# Patient Record
Sex: FEMALE | Race: WHITE | NOT HISPANIC OR LATINO | Employment: FULL TIME | URBAN - METROPOLITAN AREA
[De-identification: names, ages, dates, MRNs, and addresses within clinical notes are randomized per-mention and may not be internally consistent; named-entity substitution may affect disease eponyms.]

---

## 2017-04-25 ENCOUNTER — OFFICE VISIT - HEALTHEAST (OUTPATIENT)
Dept: FAMILY MEDICINE | Facility: CLINIC | Age: 42
End: 2017-04-25

## 2017-04-25 ENCOUNTER — COMMUNICATION - HEALTHEAST (OUTPATIENT)
Dept: FAMILY MEDICINE | Facility: CLINIC | Age: 42
End: 2017-04-25

## 2017-04-25 DIAGNOSIS — F41.9 ANXIETY: ICD-10-CM

## 2017-04-25 DIAGNOSIS — K14.3 COATED TONGUE: ICD-10-CM

## 2017-04-25 DIAGNOSIS — E55.9 VITAMIN D DEFICIENCY: ICD-10-CM

## 2017-04-25 DIAGNOSIS — K58.9 IRRITABLE BOWEL SYNDROME: ICD-10-CM

## 2017-04-25 DIAGNOSIS — Z13.0 SCREENING FOR DEFICIENCY ANEMIA: ICD-10-CM

## 2017-04-25 DIAGNOSIS — M25.50 JOINT ACHE: ICD-10-CM

## 2017-04-28 ENCOUNTER — COMMUNICATION - HEALTHEAST (OUTPATIENT)
Dept: FAMILY MEDICINE | Facility: CLINIC | Age: 42
End: 2017-04-28

## 2017-05-01 ENCOUNTER — COMMUNICATION - HEALTHEAST (OUTPATIENT)
Dept: FAMILY MEDICINE | Facility: CLINIC | Age: 42
End: 2017-05-01

## 2017-05-02 ENCOUNTER — OFFICE VISIT - HEALTHEAST (OUTPATIENT)
Dept: FAMILY MEDICINE | Facility: CLINIC | Age: 42
End: 2017-05-02

## 2017-05-02 ENCOUNTER — COMMUNICATION - HEALTHEAST (OUTPATIENT)
Dept: FAMILY MEDICINE | Facility: CLINIC | Age: 42
End: 2017-05-02

## 2017-05-02 DIAGNOSIS — R76.8 ELEVATED SACCHAROMYCES CEREVISIAE ANTIBODY LEVEL: ICD-10-CM

## 2017-05-02 DIAGNOSIS — E55.9 VITAMIN D DEFICIENCY: ICD-10-CM

## 2017-05-02 DIAGNOSIS — E06.3 HYPOTHYROIDISM DUE TO HASHIMOTO'S THYROIDITIS: ICD-10-CM

## 2017-05-09 ENCOUNTER — COMMUNICATION - HEALTHEAST (OUTPATIENT)
Dept: FAMILY MEDICINE | Facility: CLINIC | Age: 42
End: 2017-05-09

## 2017-05-15 ENCOUNTER — AMBULATORY - HEALTHEAST (OUTPATIENT)
Dept: FAMILY MEDICINE | Facility: CLINIC | Age: 42
End: 2017-05-15

## 2017-05-15 ENCOUNTER — COMMUNICATION - HEALTHEAST (OUTPATIENT)
Dept: FAMILY MEDICINE | Facility: CLINIC | Age: 42
End: 2017-05-15

## 2017-06-06 ENCOUNTER — OFFICE VISIT - HEALTHEAST (OUTPATIENT)
Dept: FAMILY MEDICINE | Facility: CLINIC | Age: 42
End: 2017-06-06

## 2017-06-06 ENCOUNTER — COMMUNICATION - HEALTHEAST (OUTPATIENT)
Dept: FAMILY MEDICINE | Facility: CLINIC | Age: 42
End: 2017-06-06

## 2017-06-06 DIAGNOSIS — E03.9 HYPOTHYROID: ICD-10-CM

## 2017-06-06 DIAGNOSIS — F43.10 PTSD (POST-TRAUMATIC STRESS DISORDER): ICD-10-CM

## 2017-06-06 DIAGNOSIS — F41.9 ANXIETY: ICD-10-CM

## 2017-06-06 ASSESSMENT — MIFFLIN-ST. JEOR: SCORE: 1206.53

## 2017-06-12 ENCOUNTER — COMMUNICATION - HEALTHEAST (OUTPATIENT)
Dept: FAMILY MEDICINE | Facility: CLINIC | Age: 42
End: 2017-06-12

## 2017-06-16 ENCOUNTER — RECORDS - HEALTHEAST (OUTPATIENT)
Dept: ADMINISTRATIVE | Facility: OTHER | Age: 42
End: 2017-06-16

## 2017-06-20 ENCOUNTER — COMMUNICATION - HEALTHEAST (OUTPATIENT)
Dept: FAMILY MEDICINE | Facility: CLINIC | Age: 42
End: 2017-06-20

## 2017-06-26 ENCOUNTER — COMMUNICATION - HEALTHEAST (OUTPATIENT)
Dept: FAMILY MEDICINE | Facility: CLINIC | Age: 42
End: 2017-06-26

## 2018-03-12 ENCOUNTER — OFFICE VISIT - HEALTHEAST (OUTPATIENT)
Dept: FAMILY MEDICINE | Facility: CLINIC | Age: 43
End: 2018-03-12

## 2018-03-12 DIAGNOSIS — K58.9 IRRITABLE BOWEL SYNDROME: ICD-10-CM

## 2018-03-12 DIAGNOSIS — E06.3 HYPOTHYROIDISM DUE TO HASHIMOTO'S THYROIDITIS: ICD-10-CM

## 2018-03-12 LAB
T3FREE SERPL-MCNC: 2.8 PG/ML (ref 1.9–3.9)
T4 TOTAL - HISTORICAL: 7.8 UG/DL (ref 4.5–13)
TSH SERPL DL<=0.005 MIU/L-ACNC: 3.51 UIU/ML (ref 0.3–5)

## 2018-03-12 ASSESSMENT — MIFFLIN-ST. JEOR: SCORE: 1181.59

## 2018-03-13 ENCOUNTER — COMMUNICATION - HEALTHEAST (OUTPATIENT)
Dept: FAMILY MEDICINE | Facility: CLINIC | Age: 43
End: 2018-03-13

## 2018-03-13 LAB — THYROID PEROXIDASE ANTIBODIES - HISTORICAL: 599.3 IU/ML (ref 0–5.6)

## 2018-03-15 LAB — T3REVERSE SERPL-MCNC: 13.9 NG/DL (ref 9–27)

## 2018-03-16 LAB — TSI SER-ACNC: <1 TSI INDEX

## 2018-03-20 ENCOUNTER — COMMUNICATION - HEALTHEAST (OUTPATIENT)
Dept: FAMILY MEDICINE | Facility: CLINIC | Age: 43
End: 2018-03-20

## 2018-06-19 ENCOUNTER — OFFICE VISIT - HEALTHEAST (OUTPATIENT)
Dept: FAMILY MEDICINE | Facility: CLINIC | Age: 43
End: 2018-06-19

## 2018-06-19 DIAGNOSIS — R10.10 UPPER ABDOMINAL PAIN: ICD-10-CM

## 2018-06-19 DIAGNOSIS — F41.9 ANXIETY: ICD-10-CM

## 2018-06-19 DIAGNOSIS — Z00.00 ANNUAL PHYSICAL EXAM: ICD-10-CM

## 2018-06-19 DIAGNOSIS — E06.3 HYPOTHYROIDISM DUE TO HASHIMOTO'S THYROIDITIS: ICD-10-CM

## 2018-06-19 LAB
ALBUMIN SERPL-MCNC: 4.6 G/DL (ref 3.5–5)
ALBUMIN UR-MCNC: NEGATIVE MG/DL
ALP SERPL-CCNC: 63 U/L (ref 45–120)
ALT SERPL W P-5'-P-CCNC: <9 U/L (ref 0–45)
AMYLASE SERPL-CCNC: 57 U/L (ref 5–120)
ANION GAP SERPL CALCULATED.3IONS-SCNC: 14 MMOL/L (ref 5–18)
APPEARANCE UR: CLEAR
AST SERPL W P-5'-P-CCNC: 12 U/L (ref 0–40)
BACTERIA #/AREA URNS HPF: ABNORMAL HPF
BASOPHILS # BLD AUTO: 0.1 THOU/UL (ref 0–0.2)
BASOPHILS NFR BLD AUTO: 1 % (ref 0–2)
BILIRUB SERPL-MCNC: 0.4 MG/DL (ref 0–1)
BILIRUB UR QL STRIP: NEGATIVE
BUN SERPL-MCNC: 7 MG/DL (ref 8–22)
CALCIUM SERPL-MCNC: 9.9 MG/DL (ref 8.5–10.5)
CHLORIDE BLD-SCNC: 104 MMOL/L (ref 98–107)
CO2 SERPL-SCNC: 24 MMOL/L (ref 22–31)
COLOR UR AUTO: YELLOW
CREAT SERPL-MCNC: 0.69 MG/DL (ref 0.6–1.1)
EOSINOPHIL # BLD AUTO: 0.1 THOU/UL (ref 0–0.4)
EOSINOPHIL NFR BLD AUTO: 1 % (ref 0–6)
ERYTHROCYTE [DISTWIDTH] IN BLOOD BY AUTOMATED COUNT: 11.5 % (ref 11–14.5)
GFR SERPL CREATININE-BSD FRML MDRD: >60 ML/MIN/1.73M2
GLUCOSE BLD-MCNC: 92 MG/DL (ref 70–125)
GLUCOSE UR STRIP-MCNC: NEGATIVE MG/DL
HCT VFR BLD AUTO: 40.2 % (ref 35–47)
HGB BLD-MCNC: 13.7 G/DL (ref 12–16)
HGB UR QL STRIP: ABNORMAL
KETONES UR STRIP-MCNC: NEGATIVE MG/DL
LEUKOCYTE ESTERASE UR QL STRIP: NEGATIVE
LIPASE SERPL-CCNC: 15 U/L (ref 0–52)
LYMPHOCYTES # BLD AUTO: 1.7 THOU/UL (ref 0.8–4.4)
LYMPHOCYTES NFR BLD AUTO: 16 % (ref 20–40)
MCH RBC QN AUTO: 32.5 PG (ref 27–34)
MCHC RBC AUTO-ENTMCNC: 34 G/DL (ref 32–36)
MCV RBC AUTO: 96 FL (ref 80–100)
MONOCYTES # BLD AUTO: 0.7 THOU/UL (ref 0–0.9)
MONOCYTES NFR BLD AUTO: 6 % (ref 2–10)
NEUTROPHILS # BLD AUTO: 7.9 THOU/UL (ref 2–7.7)
NEUTROPHILS NFR BLD AUTO: 76 % (ref 50–70)
NITRATE UR QL: NEGATIVE
PH UR STRIP: 5.5 [PH] (ref 5–8)
PLATELET # BLD AUTO: 275 THOU/UL (ref 140–440)
PMV BLD AUTO: 8.1 FL (ref 7–10)
POTASSIUM BLD-SCNC: 4 MMOL/L (ref 3.5–5)
PROT SERPL-MCNC: 7.6 G/DL (ref 6–8)
RBC # BLD AUTO: 4.21 MILL/UL (ref 3.8–5.4)
RBC #/AREA URNS AUTO: ABNORMAL HPF
SODIUM SERPL-SCNC: 142 MMOL/L (ref 136–145)
SP GR UR STRIP: <=1.005 (ref 1–1.03)
SQUAMOUS #/AREA URNS AUTO: ABNORMAL LPF
UROBILINOGEN UR STRIP-ACNC: ABNORMAL
WBC #/AREA URNS AUTO: ABNORMAL HPF
WBC: 10.4 THOU/UL (ref 4–11)

## 2018-06-19 ASSESSMENT — MIFFLIN-ST. JEOR: SCORE: 1161.17

## 2018-06-20 ENCOUNTER — COMMUNICATION - HEALTHEAST (OUTPATIENT)
Dept: FAMILY MEDICINE | Facility: CLINIC | Age: 43
End: 2018-06-20

## 2018-06-20 ENCOUNTER — AMBULATORY - HEALTHEAST (OUTPATIENT)
Dept: FAMILY MEDICINE | Facility: CLINIC | Age: 43
End: 2018-06-20

## 2018-06-20 DIAGNOSIS — R31.29 MICROSCOPIC HEMATURIA: ICD-10-CM

## 2018-09-05 ENCOUNTER — OFFICE VISIT - HEALTHEAST (OUTPATIENT)
Dept: FAMILY MEDICINE | Facility: CLINIC | Age: 43
End: 2018-09-05

## 2018-09-05 DIAGNOSIS — E06.3 HASHIMOTO'S THYROIDITIS: ICD-10-CM

## 2018-09-05 DIAGNOSIS — F32.A DEPRESSION: ICD-10-CM

## 2018-09-05 DIAGNOSIS — F41.9 ANXIETY: ICD-10-CM

## 2018-09-05 LAB
T3FREE SERPL-MCNC: 3 PG/ML (ref 1.9–3.9)
T4 FREE SERPL-MCNC: 0.9 NG/DL (ref 0.7–1.8)
TSH SERPL DL<=0.005 MIU/L-ACNC: 2.66 UIU/ML (ref 0.3–5)

## 2018-09-05 ASSESSMENT — MIFFLIN-ST. JEOR: SCORE: 1156.64

## 2018-09-06 LAB — THYROID PEROXIDASE ANTIBODIES - HISTORICAL: 746.3 IU/ML (ref 0–5.6)

## 2018-11-26 ENCOUNTER — OFFICE VISIT - HEALTHEAST (OUTPATIENT)
Dept: FAMILY MEDICINE | Facility: CLINIC | Age: 43
End: 2018-11-26

## 2018-11-26 ENCOUNTER — COMMUNICATION - HEALTHEAST (OUTPATIENT)
Dept: FAMILY MEDICINE | Facility: CLINIC | Age: 43
End: 2018-11-26

## 2018-11-26 DIAGNOSIS — E06.3 HYPOTHYROIDISM DUE TO HASHIMOTO'S THYROIDITIS: ICD-10-CM

## 2018-11-26 DIAGNOSIS — F41.1 ANXIETY STATE: ICD-10-CM

## 2018-11-26 ASSESSMENT — MIFFLIN-ST. JEOR: SCORE: 1147.57

## 2019-01-04 ENCOUNTER — COMMUNICATION - HEALTHEAST (OUTPATIENT)
Dept: FAMILY MEDICINE | Facility: CLINIC | Age: 44
End: 2019-01-04

## 2019-02-11 ENCOUNTER — COMMUNICATION - HEALTHEAST (OUTPATIENT)
Dept: FAMILY MEDICINE | Facility: CLINIC | Age: 44
End: 2019-02-11

## 2019-02-11 DIAGNOSIS — F41.1 ANXIETY STATE: ICD-10-CM

## 2019-02-25 ENCOUNTER — COMMUNICATION - HEALTHEAST (OUTPATIENT)
Dept: FAMILY MEDICINE | Facility: CLINIC | Age: 44
End: 2019-02-25

## 2019-02-25 ENCOUNTER — OFFICE VISIT - HEALTHEAST (OUTPATIENT)
Dept: FAMILY MEDICINE | Facility: CLINIC | Age: 44
End: 2019-02-25

## 2019-02-25 DIAGNOSIS — F41.1 ANXIETY STATE: ICD-10-CM

## 2019-02-25 DIAGNOSIS — E06.3 HYPOTHYROIDISM DUE TO HASHIMOTO'S THYROIDITIS: ICD-10-CM

## 2019-02-25 DIAGNOSIS — Z12.39 ENCOUNTER FOR OTHER SCREENING FOR MALIGNANT NEOPLASM OF BREAST: ICD-10-CM

## 2019-02-25 LAB
T3 SERPL-MCNC: 89 NG/DL (ref 45–175)
TSH SERPL DL<=0.005 MIU/L-ACNC: 4.42 UIU/ML (ref 0.3–5)

## 2019-02-26 LAB — THYROID PEROXIDASE ANTIBODIES - HISTORICAL: 846.8 IU/ML (ref 0–5.6)

## 2019-03-03 ENCOUNTER — COMMUNICATION - HEALTHEAST (OUTPATIENT)
Dept: FAMILY MEDICINE | Facility: CLINIC | Age: 44
End: 2019-03-03

## 2019-04-16 ENCOUNTER — COMMUNICATION - HEALTHEAST (OUTPATIENT)
Dept: FAMILY MEDICINE | Facility: CLINIC | Age: 44
End: 2019-04-16

## 2019-04-16 DIAGNOSIS — F41.1 ANXIETY STATE: ICD-10-CM

## 2019-04-29 ENCOUNTER — HOSPITAL ENCOUNTER (OUTPATIENT)
Dept: ULTRASOUND IMAGING | Facility: HOSPITAL | Age: 44
Discharge: HOME | End: 2019-04-29
Attending: FAMILY MEDICINE

## 2019-04-29 ENCOUNTER — RECORDS - HEALTHEAST (OUTPATIENT)
Dept: RADIOLOGY | Facility: CLINIC | Age: 44
End: 2019-04-29

## 2019-04-29 ENCOUNTER — COMMUNICATION - HEALTHEAST (OUTPATIENT)
Dept: FAMILY MEDICINE | Facility: CLINIC | Age: 44
End: 2019-04-29

## 2019-04-29 ENCOUNTER — HOSPITAL ENCOUNTER (OUTPATIENT)
Dept: MAMMOGRAPHY | Facility: CLINIC | Age: 44
Discharge: HOME | End: 2019-04-29
Attending: FAMILY MEDICINE

## 2019-04-29 ENCOUNTER — RECORDS - HEALTHEAST (OUTPATIENT)
Dept: ADMINISTRATIVE | Facility: OTHER | Age: 44
End: 2019-04-29

## 2019-04-29 DIAGNOSIS — E06.3 HYPOTHYROIDISM DUE TO HASHIMOTO'S THYROIDITIS: ICD-10-CM

## 2019-04-29 DIAGNOSIS — Z12.39 ENCOUNTER FOR OTHER SCREENING FOR MALIGNANT NEOPLASM OF BREAST: ICD-10-CM

## 2019-06-07 ENCOUNTER — AMBULATORY - HEALTHEAST (OUTPATIENT)
Dept: FAMILY MEDICINE | Facility: CLINIC | Age: 44
End: 2019-06-07

## 2019-06-07 ENCOUNTER — COMMUNICATION - HEALTHEAST (OUTPATIENT)
Dept: FAMILY MEDICINE | Facility: CLINIC | Age: 44
End: 2019-06-07

## 2019-06-07 DIAGNOSIS — F41.1 ANXIETY STATE: ICD-10-CM

## 2019-08-08 ENCOUNTER — COMMUNICATION - HEALTHEAST (OUTPATIENT)
Dept: FAMILY MEDICINE | Facility: CLINIC | Age: 44
End: 2019-08-08

## 2019-08-08 ENCOUNTER — OFFICE VISIT - HEALTHEAST (OUTPATIENT)
Dept: FAMILY MEDICINE | Facility: CLINIC | Age: 44
End: 2019-08-08

## 2019-08-08 DIAGNOSIS — E55.9 VITAMIN D DEFICIENCY: ICD-10-CM

## 2019-08-08 DIAGNOSIS — F41.1 ANXIETY STATE: ICD-10-CM

## 2019-08-08 DIAGNOSIS — E06.3 HYPOTHYROIDISM DUE TO HASHIMOTO'S THYROIDITIS: ICD-10-CM

## 2019-08-08 LAB
T3 SERPL-MCNC: 100 NG/DL (ref 45–175)
T4 FREE SERPL-MCNC: 0.8 NG/DL (ref 0.7–1.8)
THYROID PEROXIDASE ANTIBODIES - HISTORICAL: 849.3 IU/ML (ref 0–5.6)
TSH SERPL DL<=0.005 MIU/L-ACNC: 6.67 UIU/ML (ref 0.3–5)

## 2019-08-08 ASSESSMENT — MIFFLIN-ST. JEOR: SCORE: 1236.02

## 2019-08-09 LAB — 25(OH)D3 SERPL-MCNC: 30.4 NG/ML (ref 30–80)

## 2019-08-20 ENCOUNTER — COMMUNICATION - HEALTHEAST (OUTPATIENT)
Dept: FAMILY MEDICINE | Facility: CLINIC | Age: 44
End: 2019-08-20

## 2019-09-15 ENCOUNTER — COMMUNICATION - HEALTHEAST (OUTPATIENT)
Dept: FAMILY MEDICINE | Facility: CLINIC | Age: 44
End: 2019-09-15

## 2019-09-15 DIAGNOSIS — F41.1 ANXIETY STATE: ICD-10-CM

## 2019-10-08 ENCOUNTER — OFFICE VISIT - HEALTHEAST (OUTPATIENT)
Dept: FAMILY MEDICINE | Facility: CLINIC | Age: 44
End: 2019-10-08

## 2019-10-08 DIAGNOSIS — E06.3 HYPOTHYROIDISM DUE TO HASHIMOTO'S THYROIDITIS: ICD-10-CM

## 2019-10-08 DIAGNOSIS — R35.0 URINARY FREQUENCY: ICD-10-CM

## 2019-10-08 LAB
ALBUMIN UR-MCNC: NEGATIVE MG/DL
APPEARANCE UR: CLEAR
BACTERIA #/AREA URNS HPF: ABNORMAL HPF
BILIRUB UR QL STRIP: NEGATIVE
COLOR UR AUTO: YELLOW
GLUCOSE UR STRIP-MCNC: NEGATIVE MG/DL
HGB UR QL STRIP: ABNORMAL
KETONES UR STRIP-MCNC: NEGATIVE MG/DL
LEUKOCYTE ESTERASE UR QL STRIP: NEGATIVE
NITRATE UR QL: NEGATIVE
PH UR STRIP: 7 [PH] (ref 5–8)
RBC #/AREA URNS AUTO: ABNORMAL HPF
SP GR UR STRIP: 1.01 (ref 1–1.03)
SQUAMOUS #/AREA URNS AUTO: ABNORMAL LPF
TSH SERPL DL<=0.005 MIU/L-ACNC: 2.09 UIU/ML (ref 0.3–5)
UROBILINOGEN UR STRIP-ACNC: ABNORMAL
WBC #/AREA URNS AUTO: ABNORMAL HPF

## 2019-10-08 ASSESSMENT — MIFFLIN-ST. JEOR: SCORE: 1217.88

## 2019-10-21 ENCOUNTER — COMMUNICATION - HEALTHEAST (OUTPATIENT)
Dept: FAMILY MEDICINE | Facility: CLINIC | Age: 44
End: 2019-10-21

## 2019-11-19 ENCOUNTER — COMMUNICATION - HEALTHEAST (OUTPATIENT)
Dept: FAMILY MEDICINE | Facility: CLINIC | Age: 44
End: 2019-11-19

## 2019-11-19 DIAGNOSIS — E06.3 HYPOTHYROIDISM DUE TO HASHIMOTO'S THYROIDITIS: ICD-10-CM

## 2020-01-03 ENCOUNTER — OFFICE VISIT - HEALTHEAST (OUTPATIENT)
Dept: FAMILY MEDICINE | Facility: CLINIC | Age: 45
End: 2020-01-03

## 2020-01-03 DIAGNOSIS — R32 URINARY INCONTINENCE, UNSPECIFIED TYPE: ICD-10-CM

## 2020-01-03 DIAGNOSIS — R35.0 FREQUENCY OF URINATION: ICD-10-CM

## 2020-01-03 DIAGNOSIS — E06.3 HYPOTHYROIDISM DUE TO HASHIMOTO'S THYROIDITIS: ICD-10-CM

## 2020-01-03 LAB
T3 SERPL-MCNC: 106 NG/DL (ref 45–175)
T4 FREE SERPL-MCNC: 0.7 NG/DL (ref 0.7–1.8)
TSH SERPL DL<=0.005 MIU/L-ACNC: 2.66 UIU/ML (ref 0.3–5)

## 2020-01-03 ASSESSMENT — MIFFLIN-ST. JEOR: SCORE: 1225.82

## 2020-01-06 ENCOUNTER — COMMUNICATION - HEALTHEAST (OUTPATIENT)
Dept: FAMILY MEDICINE | Facility: CLINIC | Age: 45
End: 2020-01-06

## 2020-01-20 ENCOUNTER — RECORDS - HEALTHEAST (OUTPATIENT)
Dept: ADMINISTRATIVE | Facility: OTHER | Age: 45
End: 2020-01-20

## 2020-03-17 ENCOUNTER — COMMUNICATION - HEALTHEAST (OUTPATIENT)
Dept: FAMILY MEDICINE | Facility: CLINIC | Age: 45
End: 2020-03-17

## 2020-03-17 DIAGNOSIS — F41.1 ANXIETY STATE: ICD-10-CM

## 2020-03-19 RX ORDER — ALPRAZOLAM 0.5 MG
TABLET ORAL
Qty: 30 TABLET | Refills: 0 | Status: SHIPPED | OUTPATIENT
Start: 2020-03-19

## 2020-09-06 ENCOUNTER — COMMUNICATION - HEALTHEAST (OUTPATIENT)
Dept: FAMILY MEDICINE | Facility: CLINIC | Age: 45
End: 2020-09-06

## 2020-09-06 DIAGNOSIS — F41.1 ANXIETY STATE: ICD-10-CM

## 2020-11-04 ENCOUNTER — VIRTUAL VISIT (OUTPATIENT)
Dept: URGENT CARE | Facility: CLINIC | Age: 45
End: 2020-11-04

## 2020-11-04 DIAGNOSIS — R05.9 COUGH: Primary | ICD-10-CM

## 2020-11-05 NOTE — PROGRESS NOTES
Note: Attempted to reach patient multiple times starting at 6:30 PM of her appointment up through 9:00 PM.  I left multiple messages and told her to reengage with another appointment time if she needed our assistance.

## 2020-11-22 ENCOUNTER — COMMUNICATION - HEALTHEAST (OUTPATIENT)
Dept: FAMILY MEDICINE | Facility: CLINIC | Age: 45
End: 2020-11-22

## 2020-11-22 DIAGNOSIS — E06.3 HYPOTHYROIDISM DUE TO HASHIMOTO'S THYROIDITIS: ICD-10-CM

## 2020-12-18 ENCOUNTER — COMMUNICATION - HEALTHEAST (OUTPATIENT)
Dept: FAMILY MEDICINE | Facility: CLINIC | Age: 45
End: 2020-12-18

## 2020-12-18 DIAGNOSIS — F41.1 ANXIETY STATE: ICD-10-CM

## 2020-12-22 ENCOUNTER — OFFICE VISIT - HEALTHEAST (OUTPATIENT)
Dept: FAMILY MEDICINE | Facility: CLINIC | Age: 45
End: 2020-12-22

## 2020-12-22 DIAGNOSIS — E06.3 HYPOTHYROIDISM DUE TO HASHIMOTO'S THYROIDITIS: ICD-10-CM

## 2020-12-22 DIAGNOSIS — F43.10 PTSD (POST-TRAUMATIC STRESS DISORDER): ICD-10-CM

## 2020-12-22 DIAGNOSIS — E55.9 VITAMIN D DEFICIENCY: ICD-10-CM

## 2020-12-22 LAB
T3 SERPL-MCNC: 104 NG/DL (ref 45–175)
T4 FREE SERPL-MCNC: 0.7 NG/DL (ref 0.7–1.8)
TSH SERPL DL<=0.005 MIU/L-ACNC: 5.74 UIU/ML (ref 0.3–5)

## 2020-12-22 ASSESSMENT — PATIENT HEALTH QUESTIONNAIRE - PHQ9: SUM OF ALL RESPONSES TO PHQ QUESTIONS 1-9: 8

## 2020-12-22 NOTE — ASSESSMENT & PLAN NOTE
Low normal vitamin D  Encouraged to take a supplement 5000 IU daily     specially in the setting of COVID-19
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
## 2020-12-24 ENCOUNTER — COMMUNICATION - HEALTHEAST (OUTPATIENT)
Dept: FAMILY MEDICINE | Facility: CLINIC | Age: 45
End: 2020-12-24

## 2021-01-29 ENCOUNTER — COMMUNICATION - HEALTHEAST (OUTPATIENT)
Dept: FAMILY MEDICINE | Facility: CLINIC | Age: 46
End: 2021-01-29

## 2021-01-31 ENCOUNTER — AMBULATORY - HEALTHEAST (OUTPATIENT)
Dept: FAMILY MEDICINE | Facility: CLINIC | Age: 46
End: 2021-01-31

## 2021-03-22 ENCOUNTER — COMMUNICATION - HEALTHEAST (OUTPATIENT)
Dept: FAMILY MEDICINE | Facility: CLINIC | Age: 46
End: 2021-03-22

## 2021-03-22 DIAGNOSIS — E06.3 HYPOTHYROIDISM DUE TO HASHIMOTO'S THYROIDITIS: ICD-10-CM

## 2021-03-22 RX ORDER — THYROID 30 MG/1
30 TABLET ORAL DAILY
Qty: 90 TABLET | Refills: 1 | Status: SHIPPED | OUTPATIENT
Start: 2021-03-22 | End: 2021-09-15

## 2021-03-22 RX ORDER — THYROID,PORK 15 MG
1 TABLET ORAL DAILY
Qty: 90 TABLET | Refills: 1 | Status: SHIPPED | OUTPATIENT
Start: 2021-03-22 | End: 2021-09-15

## 2021-04-07 ENCOUNTER — COMMUNICATION - HEALTHEAST (OUTPATIENT)
Dept: FAMILY MEDICINE | Facility: CLINIC | Age: 46
End: 2021-04-07

## 2021-04-07 DIAGNOSIS — F41.1 ANXIETY STATE: ICD-10-CM

## 2021-04-08 RX ORDER — SERTRALINE HYDROCHLORIDE 100 MG/1
100 TABLET, FILM COATED ORAL DAILY
Qty: 90 TABLET | Refills: 1 | Status: SHIPPED | OUTPATIENT
Start: 2021-04-08 | End: 2021-10-12

## 2021-05-26 ASSESSMENT — PATIENT HEALTH QUESTIONNAIRE - PHQ9: SUM OF ALL RESPONSES TO PHQ QUESTIONS 1-9: 8

## 2021-05-30 VITALS — BODY MASS INDEX: 25.83 KG/M2 | WEIGHT: 141.25 LBS

## 2021-05-30 VITALS — BODY MASS INDEX: 25.97 KG/M2 | WEIGHT: 142 LBS

## 2021-05-31 VITALS — WEIGHT: 133 LBS | HEIGHT: 62 IN | BODY MASS INDEX: 24.48 KG/M2

## 2021-05-31 NOTE — PROGRESS NOTES
ASSESSMENT & PLAN    No problem-specific Assessment & Plan notes found for this encounter.      Tiffanie was seen today for follow-up.    Diagnoses and all orders for this visit:    Hypothyroidism due to Hashimoto's thyroiditis  -     thyroid, pork, (ARMOUR THYROID) 15 mg Tab; 1/2 TABLET  DAILY FOR 3 WEEKS THEN MAY INCREASE TO 1 DAILY  -     T3, Total  -     Thyroid Stimulating Hormone (TSH)  -     Thyroid Peroxidase Antibody  -     T4, Free    Anxiety Disorder NOS  -     ALPRAZolam (XANAX) 0.5 MG tablet; Take 1 tablet (0.5 mg total) by mouth 3 (three) times a day as needed for sleep or anxiety.  -     Ambulatory referral to Psychology    Vitamin D deficiency  -     Vitamin D, Total (25-Hydroxy)        Patient Instructions   Thank you for coming in today    We are going to start [place] Thyroid 15 mg 1/2 tablet daily equaling 7.5 mg total for the first 3 weeks  You may increase this to 1 tablet daily if this is effective but does not cause adverse symptoms    Our goal is for your TSH equal 1  Our goal is for your T3 level to be at high end of normal    For Hashimoto's there is some data that naltrexone low-dose may be helpful please read about this medication    Continue to support your clinical condition with restorative sleep, regular exercise, and high nutrient foods including continue to eliminate gluten and dairy    I have put in a referral for Shyanne Simons    Vitamins and nutrients to support your thyroid function are:    You may find these in numerous multivitamins:    Selenium 200 mcg daily  Zinc 30 mg Daily/ Copper 1 mg   Vitamin D3 / K2 2000- 5000 IU /  mcg Daily (Shoot for a level of 50-60 vitamin D--need to follow)  Iodine 150 mcg Daily  Vitamin A 5000 IU Daily  Iron 18 mg Daily      Consider  Nigella Sativa (Black Cumin) 1 gram Twice Daily      Nutrients to help T4àT3 Conversion    Iodine 300 -1000 mcg Daily  L-Tyrosine 300-1000 mg Daily  Selenium 100-600 mcg Daily  Vitamin A 5758-8282 IU  Daily  Vitamin E 200-400 IU Daily  N Acetyl Cysteine  500-2000 mg Daily  Zinc 30 mg Daily  Copper 1 mg Daily  Ashwagandha 500 -1500 mg Daily  Guggulipid extract  Turmeric (Curcumin) 500-1000 mg Daily      Think Autoimmune suppression IF High TSH and High TPO   This is you               No follow-ups on file.            CHIEF COMPLAINT: Tiffanie Ye had concerns including Follow-up (medication check, check thyroid).    False Pass: 1.............. had concerns including Follow-up (medication check, check thyroid).    1. Hypothyroidism due to Hashimoto's thyroiditis    2. Anxiety Disorder NOS    3. Vitamin D deficiency          CC:             Why are you here today?                              Follow up medications, thyroid    Continue to have symptoms of anxiety  Has identified that work is the most prominent stressor  Had been some DBT but little too intense  Currently on sertraline doing well 100mg is replacing vitamin D  Works from home feels better  Wants to reincorporate exercise as well as other things into her nutritional plan  Prominent trouble falling asleep staying asleep feeling bad about herself  Also more than half of today's moving slowly overeating feeling tired feeling down depressed little interest in doing things somewhat difficult she relates is her scale  PHQ 9 today is 17    Any other Problems in order of Priority:        SUBJECTIVE:  Tiffanie Ye is a 44 y.o. female    No past medical history on file.  Past Surgical History:   Procedure Laterality Date     HI D&C OF CERVIX STUMP      Description: Dilation And Curettage Of Cervical Stump;  Recorded: 09/08/2013;     Penicillins  Current Outpatient Medications   Medication Sig Dispense Refill     cholecalciferol, vitamin D3, (VITAMIN D3) 2,000 unit cap Take by mouth.       sertraline (ZOLOFT) 100 MG tablet Take 1 tablet (100 mg total) by mouth daily. 90 tablet 1     UNABLE TO FIND 2 capsules daily. Med Name: (CuraPro) Powerful Antioxidant         UNABLE TO FIND Med Name:mushrooms + herbs       ALPRAZolam (XANAX) 0.5 MG tablet Take 1 tablet (0.5 mg total) by mouth 3 (three) times a day as needed for sleep or anxiety. 30 tablet 0     thyroid, pork, (ARMOUR THYROID) 15 mg Tab 1/2 TABLET  DAILY FOR 3 WEEKS THEN MAY INCREASE TO 1 DAILY 30 tablet 2     No current facility-administered medications for this visit.      Family History   Problem Relation Age of Onset     Breast cancer Mother 67     Hypertension Father      Colon cancer Paternal Grandmother 80     Breast cancer Maternal Grandmother      Social History     Socioeconomic History     Marital status: Single     Spouse name: None     Number of children: None     Years of education: None     Highest education level: None   Occupational History     None   Social Needs     Financial resource strain: None     Food insecurity:     Worry: None     Inability: None     Transportation needs:     Medical: None     Non-medical: None   Tobacco Use     Smoking status: Never Smoker     Smokeless tobacco: Never Used   Substance and Sexual Activity     Alcohol use: Yes     Comment: once every 2 weeks     Drug use: No     Sexual activity: None   Lifestyle     Physical activity:     Days per week: None     Minutes per session: None     Stress: None   Relationships     Social connections:     Talks on phone: None     Gets together: None     Attends Mandaeism service: None     Active member of club or organization: None     Attends meetings of clubs or organizations: None     Relationship status: None     Intimate partner violence:     Fear of current or ex partner: None     Emotionally abused: None     Physically abused: None     Forced sexual activity: None   Other Topics Concern     None   Social History Narrative     None     Patient Active Problem List   Diagnosis     Herpes Simplex Type I     Genital Warts     Common Migraine (Without Aura)     Allergic Rhinitis     Anxiety Disorder NOS     Irritable Bowel Syndrome      "Hypothyroidism due to Hashimoto's thyroiditis     Vitamin D deficiency     Elevated Saccharomyces cerevisiae antibody level IgA  2017                                              SOCIAL: She  reports that she has never smoked. She has never used smokeless tobacco. She reports that she drinks alcohol. She reports that she does not use drugs.    REVIEW OF SYSTEMS:   Family history not pertinent to chief complaint or presenting problem    Review of Systems:      Nervous System:  No headache, paresthesia or dizziness or fainting                                  Ears: No hearing loss or ringing in the ears    Eyes: No blurring of vision, Double Vision            No redness, itching or dryness.    Nose: No nosebleed or loss of smell    Mouth: No mouth sores or  coated tongue    Throat: No hoarseness or difficulty swallowing    Neck: No enlarged thyroid or lymph nodes.    Heart: No chest pain, palpitation or irregular heartbeat.                  Lungs: No shortness of breath, wheezing or hemoptysis.    Gastrointestinal: No nausea or vomiting, melena or blood in stools.    Kidney/Bladdr: No polyuria, polydipsia, or hematuria.                                                          Skin: Does have a perioral dermatitis    Muscles/Joints/Bones: No Muscle morning stiffness, No Effusion of a Joint     Review of systems otherwise negative as requested from patient, except   Those positive ROS outlined and discussed in [place].    OBJECTIVE:  /60 (Patient Site: Right Arm, Patient Position: Sitting, Cuff Size: Adult Regular)   Pulse 62   Ht 5' 3\" (1.6 m)   Wt 136 lb (61.7 kg)   SpO2 99%   BMI 24.09 kg/m      GENERAL:     No acute distress.   Alert and oriented X 3         Physical:    Mild perioral dermatitis  TMs are clear  Oropharynx is clear gingival tissue looks normal  No dental caries  No plaque buildup  No cervical or subclavicular nodes  Thyroid palpable no nodules  Lungs are clear  Cardiac S1-S2 regular sinus " appreciable murmur gallop full range of affect normal nonpressured speech no tremor  G PHQ 9 is 17    Recent Results (from the past 240 hour(s))   T3, Total   Result Value Ref Range    T3, Total 100 45 - 175 ng/dL   Thyroid Stimulating Hormone (TSH)   Result Value Ref Range    TSH 6.67 (H) 0.30 - 5.00 uIU/mL   Thyroid Peroxidase Antibody   Result Value Ref Range    Thyroid Peroxidase Ab 849.3 (H) 0.0 - 5.6 IU/mL   T4, Free   Result Value Ref Range    Free T4 0.8 0.7 - 1.8 ng/dL   Vitamin D, Total (25-Hydroxy)   Result Value Ref Range    Vitamin D, Total (25-Hydroxy) 30.4 30.0 - 80.0 ng/mL           ASSESSMENT & PLAN      Tiffanie was seen today for follow-up.    Diagnoses and all orders for this visit:    Hypothyroidism due to Hashimoto's thyroiditis  -     thyroid, pork, (ARMOUR THYROID) 15 mg Tab; 1/2 TABLET  DAILY FOR 3 WEEKS THEN MAY INCREASE TO 1 DAILY  -     T3, Total  -     Thyroid Stimulating Hormone (TSH)  -     Thyroid Peroxidase Antibody  -     T4, Free    Anxiety Disorder NOS  -     ALPRAZolam (XANAX) 0.5 MG tablet; Take 1 tablet (0.5 mg total) by mouth 3 (three) times a day as needed for sleep or anxiety.  -     Ambulatory referral to Psychology    Vitamin D deficiency  -     Vitamin D, Total (25-Hydroxy)        No follow-ups on file.       Anticipatory Guidance and Symptomatic Cares Discussed   Advised to call back directly if there are further questions, or if these symptoms fail to improve as anticipated or worsen.  Return to clinic if patient has a clinical concern that warrants an exam.         I spent 25 minutes with this patient face to face, of which 50% or greater was spent in counseling and coordination of care with regards to Tiffanie was seen today for follow-up.    Diagnoses and all orders for this visit:    Hypothyroidism due to Hashimoto's thyroiditis  -     thyroid, pork, (ARMOUR THYROID) 15 mg Tab; 1/2 TABLET  DAILY FOR 3 WEEKS THEN MAY INCREASE TO 1 DAILY  -     T3, Total  -     Thyroid  Stimulating Hormone (TSH)  -     Thyroid Peroxidase Antibody  -     T4, Free    Anxiety Disorder NOS  -     ALPRAZolam (XANAX) 0.5 MG tablet; Take 1 tablet (0.5 mg total) by mouth 3 (three) times a day as needed for sleep or anxiety.  -     Ambulatory referral to Psychology    Vitamin D deficiency  -     Vitamin D, Total (25-Hydroxy)        Valdo Pederson MD  [facility] [address] [phone]

## 2021-05-31 NOTE — PATIENT INSTRUCTIONS - HE
Thank you for coming in today    We are going to start [place] Thyroid 15 mg 1/2 tablet daily equaling 7.5 mg total for the first 3 weeks  You may increase this to 1 tablet daily if this is effective but does not cause adverse symptoms    Our goal is for your TSH equal 1  Our goal is for your T3 level to be at high end of normal    For Hashimoto's there is some data that naltrexone low-dose may be helpful please read about this medication    Continue to support your clinical condition with restorative sleep, regular exercise, and high nutrient foods including continue to eliminate gluten and dairy    I have put in a referral for Shyanne Simons    Vitamins and nutrients to support your thyroid function are:    You may find these in numerous multivitamins:    Selenium 200 mcg daily  Zinc 30 mg Daily/ Copper 1 mg   Vitamin D3 / K2 2000- 5000 IU /  mcg Daily (Shoot for a level of 50-60 vitamin D--need to follow)  Iodine 150 mcg Daily  Vitamin A 5000 IU Daily  Iron 18 mg Daily      Consider  Nigella Sativa (Black Cumin) 1 gram Twice Daily      Nutrients to help T4àT3 Conversion    Iodine 300 -1000 mcg Daily  L-Tyrosine 300-1000 mg Daily  Selenium 100-600 mcg Daily  Vitamin A 9025-9461 IU Daily  Vitamin E 200-400 IU Daily  N Acetyl Cysteine  500-2000 mg Daily  Zinc 30 mg Daily  Copper 1 mg Daily  Ashwagandha 500 -1500 mg Daily  Guggulipid extract  Turmeric (Curcumin) 500-1000 mg Daily      Think Autoimmune suppression IF High TSH and High TPO   This is you     Perioral Dermatitis trouble some I would recommend clindamycin facial gel

## 2021-05-31 NOTE — TELEPHONE ENCOUNTER
8-8-19  Home Fernandez,  Say I had a patient in my office today, I provided her with the packet, but if you could keep Specialty Scheduling in the loop on if you can see this patient that would be great. Patient is a patient of Dr Pederson's  Karma Medley

## 2021-06-01 VITALS — BODY MASS INDEX: 22.63 KG/M2 | HEIGHT: 62 IN | WEIGHT: 123 LBS

## 2021-06-01 VITALS — HEIGHT: 62 IN | BODY MASS INDEX: 23.46 KG/M2 | WEIGHT: 127.5 LBS

## 2021-06-01 VITALS — BODY MASS INDEX: 22.45 KG/M2 | HEIGHT: 62 IN | WEIGHT: 122 LBS

## 2021-06-01 NOTE — TELEPHONE ENCOUNTER
Refill Approved    Rx renewed per Medication Renewal Policy. Medication was last renewed on 6/7/19, last OV 8/8/19.    Mary Carmen Giang, [facility] Triage/Med Refill 9/15/2019     Requested Prescriptions   Pending Prescriptions Disp Refills     sertraline (ZOLOFT) 100 MG tablet [Pharmacy Med Name: SERTRALINE 100MG TABLETS] 90 tablet 0     Sig: TAKE 1 TABLET BY MOUTH DAILY       [facility] Refill Protocol  Passed - 9/15/2019  1:06 PM        Passed - PCP or prescribing provider visit in last year     Last office visit with prescriber/PCP: 8/8/2019 Valdo Pederson MD OR same dept: 8/8/2019 Valdo Pederson MD OR same specialty: 8/8/2019 Valdo Pederson MD  Last physical: Visit date not found Last MTM visit: Visit date not found   Next visit within 3 mo: Visit date not found  Next physical within 3 mo: Visit date not found  Prescriber OR PCP: Valdo Pederson MD  Last diagnosis associated with med order: 1. Anxiety Disorder NOS  - sertraline (ZOLOFT) 100 MG tablet [Pharmacy Med Name: SERTRALINE 100MG TABLETS]; TAKE 1 TABLET BY MOUTH DAILY  Dispense: 90 tablet; Refill: 0    If protocol passes may refill for 12 months if within 3 months of last provider visit (or a total of 15 months).

## 2021-06-02 VITALS — BODY MASS INDEX: 22.08 KG/M2 | HEIGHT: 62 IN | WEIGHT: 120 LBS

## 2021-06-02 VITALS — WEIGHT: 132 LBS | BODY MASS INDEX: 24.14 KG/M2

## 2021-06-02 NOTE — PROGRESS NOTES
Assessment and Plan    1. Frequency of urination  - Urinalysis-UC if Indicated - NORMAL - discussed trial of avoiding soap in vulvar area all together, as this can be an irritant. If this does not work, labs to continue work up  - Chlamydia trachomatis & Neisseria gonorrhoeae, Amplified Detection; Future  - Glucose; Future    2. Hypothyroidism due to Hashimoto's thyroiditis  - Thyroid Stimulating Hormone (TSH)    Return in about 3 months (around 1/8/2020) for Annual physical.    Ivy Reza MD     -------------------------------------------    Chief Complaint   Patient presents with     Urinary Frequency     urgency      Labs Only     THYROID      Urinary Frequency  In Last couple of weeks, Tiffanie has been having more frequent small urine leaks, urgency , frequency; not pain with urination.  No vaginal discharge or fever, no new body products.  No change in vision - no family history of diabetes.  No concerns about STD - would be surprised    Last partner was over a year ago; a couple of months ago tried to have sex with a condom    Mother had  Some incontinence due to having several children and had her bladder lifted in her 40s, Tiffanie has not had children    Only new medication is levothyroxine - she has Anti-TPO antibodies and Dr. Pederson has been monitoring her TSH - this recently increased and she was started on levothyroxine.  However discussed this should not  cause frequency    Tends to have a lot pain in body overall - no pain own leg or pain on one side of back (I.e no indication of nerve compression severe enough to cause bladder leak)    Depression  Quit job and got a new one and that took care of a lot of things  Sertraline  PHQ-9 Total Score: 17 (8/8/2019  3:00 PM)           Current Outpatient Medications on File Prior to Visit   Medication Sig Dispense Refill     ALPRAZolam (XANAX) 0.5 MG tablet Take 1 tablet (0.5 mg total) by mouth 3 (three) times a day as needed for sleep or anxiety. 30 tablet 0      sertraline (ZOLOFT) 100 MG tablet Take 1 tablet (100 mg total) by mouth daily. 90 tablet 2     thyroid, pork, (ARMOUR THYROID) 15 mg Tab 1/2 TABLET  DAILY FOR 3 WEEKS THEN MAY INCREASE TO 1 DAILY 30 tablet 2     UNABLE TO FIND 2 capsules daily. Med Name: (CuraPro) Powerful Antioxidant        UNABLE TO FIND Med Name:mushrooms + herbs       cholecalciferol, vitamin D3, (VITAMIN D3) 2,000 unit cap Take by mouth.       No current facility-administered medications on file prior to visit.          Health Maintenance Due   Topic Date Due     HPV TEST  1975     HIV SCREENING  07/25/1991     PREVENTIVE CARE VISIT  06/19/2019     INFLUENZA VACCINE RULE BASED (1) 08/01/2019     PAP SMEAR  01/01/2020     Health Maintenance reviewed - .    The history section was last reviewed by Ashley Redmond CMA on Oct 8, 2019.    Social History     Tobacco Use   Smoking Status Never Smoker   Smokeless Tobacco Never Used       Social History     Substance and Sexual Activity   Alcohol Use Yes    Comment: once every 2 weeks         Vitals:    10/08/19 0925   BP: 112/70   Pulse: 65   SpO2: 100%     Body mass index is 23.38 kg/m .     EXAM:    General appearance - alert, well appearing, and in no distress  Mental status - normal mood, behavior, speech, dress, motor activity, and thought processes  Back exam - full range of motion, no tenderness, palpable spasm or pain on motion, no CVA tenderness  Neurological - DTR's normal and symmetric, normal muscle tone, no tremors, strength 5/5

## 2021-06-03 VITALS — WEIGHT: 136 LBS | BODY MASS INDEX: 24.1 KG/M2 | HEIGHT: 63 IN

## 2021-06-03 VITALS
DIASTOLIC BLOOD PRESSURE: 70 MMHG | HEIGHT: 63 IN | OXYGEN SATURATION: 100 % | SYSTOLIC BLOOD PRESSURE: 112 MMHG | HEART RATE: 65 BPM | BODY MASS INDEX: 23.39 KG/M2 | WEIGHT: 132 LBS

## 2021-06-03 NOTE — TELEPHONE ENCOUNTER
RN cannot approve Refill Request    RN can NOT refill this medication because provider may want to update the sig line.. Last office visit: 8/8/2019 Valdo Pederson MD Last Physical: Visit date not found Last MTM visit: Visit date not found Last visit same specialty: 10/8/2019 Ivy Reza MD.  Next visit within 3 mo: Visit date not found  Next physical within 3 mo: Visit date not found      Gina Tovar, [facility] Triage/Med Refill 11/20/2019    Requested Prescriptions   Pending Prescriptions Disp Refills     thyroid, pork, (NP THYROID) 15 mg Tab [Pharmacy Med Name: THYROID NP 0.25GR (15MG) TABLETS] 90 tablet 3       Thyroid Hormones Protocol Passed - 11/19/2019  6:00 AM        Passed - Provider visit in past 12 months or next 3 months     Last office visit with prescriber/PCP: 8/8/2019 Valdo Pederson MD OR same dept: 10/8/2019 Ivy Reza MD OR same specialty: 10/8/2019 Ivy Reza MD  Last physical: Visit date not found Last MTM visit: Visit date not found   Next visit within 3 mo: Visit date not found  Next physical within 3 mo: Visit date not found  Prescriber OR PCP: Valdo Pederson MD  Last diagnosis associated with med order: 1. Hypothyroidism due to Hashimoto's thyroiditis  - NP THYROID 15 mg Tab [Pharmacy Med Name: THYROID NP 0.25GR (15MG) TABLETS]; TAKE 1/2 TABLET BY MOUTH DAILY FOR 3 WEEKS, THEN MAY INCREASE TO 1 TABLET DAILY  Dispense: 30 tablet; Refill: 0    If protocol passes may refill for 12 months if within 3 months of last provider visit (or a total of 15 months).             Passed - TSH on file in past 12 months for patient age 12 & older     TSH   Date Value Ref Range Status   10/08/2019 2.09 0.30 - 5.00 uIU/mL Final

## 2021-06-04 VITALS
WEIGHT: 133.75 LBS | HEIGHT: 63 IN | DIASTOLIC BLOOD PRESSURE: 78 MMHG | BODY MASS INDEX: 23.7 KG/M2 | SYSTOLIC BLOOD PRESSURE: 116 MMHG | HEART RATE: 76 BPM

## 2021-06-04 NOTE — PATIENT INSTRUCTIONS - HE
Thank you for coming in today    We are checking your thyroid  We will do  TSH  Free T4  T3    I will make a referral to Dr. Raphael Reed regarding the increased frequency of urination    The size of your mole on the back is 3 mm x 4 mm please take a picture of this    Keep tabs on the light pigmented spot on the right side of your face    If you have any worries about these you can always see a dermatologist    I need to see you minimum 1 x yearly

## 2021-06-04 NOTE — PROGRESS NOTES
ASSESSMENT & PLAN  Resolving lower respiratory infection she is not concerned about this    Hypothyroid labs    Urinary incontinence frequency we will have her start with a urology visit  She may me need a pelvic ultrasound as well as a cystoscopy  She is in a high deductible we did not order this today    No problem-specific Assessment & Plan notes found for this encounter.      Tiffanie was seen today for hypothyroidism.    Diagnoses and all orders for this visit:    Hypothyroidism due to Hashimoto's thyroiditis  -     Thyroid Stimulating Hormone (TSH)  -     T3, Total  -     T4, Free    Frequency of urination  -     Ambulatory referral to Urology    Urinary incontinence, unspecified type  -     Ambulatory referral to Urology        Patient Instructions   Thank you for coming in today    We are checking your thyroid  We will do  TSH  Free T4  T3    I will make a referral to Dr. Raphael Reed regarding the increased frequency of urination    The size of your mole on the back is 3 mm x 4 mm please take a picture of this    Keep tabs on the light pigmented spot on the right side of your face    If you have any worries about these you can always see a dermatologist    I need to see you minimum 1 x yearly       Return in about 6 months (around 7/3/2020).            CHIEF COMPLAINT: Tiffanie Ye had concerns including Hypothyroidism (Follow up).    [place]: 1.............. had concerns including Hypothyroidism (Follow up).    1. Hypothyroidism due to Hashimoto's thyroiditis    2. Frequency of urination    3. Urinary incontinence, unspecified type          CC:             Why are you here today?                                  Follow-up thyroid  New job  She is a   [facility] services  Feeling better  Energy is overall better    She is feeling slightly bit more numb than she thinks she should feel  There was a death in the family and she was not able to express her emotions as well as she thought she should    Last few  months she has had increased frequency of urination as well as some leaking  No pregnancies  Urinalysis was really unremarkable  However this is persisted    We talked about urology work-up  She has a high deductible    I have referred her to Dr. Raphael Reed    She has a lesion on her right face 1 cm x 1 cm pale brown pigmentation    She is a lesion on her upper back in between her tattoos right shoulder 3 x 4 mm this has some mixed pigment she thinks this is unchanged                SUBJECTIVE:  Tiffanie Ye is a 44 y.o. female                                SOCIAL: She  reports that she has never smoked. She has never used smokeless tobacco. She reports current alcohol use. She reports that she does not use drugs.    REVIEW OF SYSTEMS:   Family history not pertinent to chief complaint or presenting problem    Review of Systems:      Nervous System:  No new or change in headache, paresthesia or tremor                                  Ears: No new hearing loss or ringing in the ears    Eyes: No new blurring of vision, Double Vision                Nose: No new nosebleed or loss of smell    Mouth: No new mouth sores or  coated tongue    Throat: No new hoarseness or difficulty swallowing    Neck: No new neck pain or mass    Heart: No new chest pain, palpitation or irregular heartbeat.                  Lungs: No new shortness of breath, wheezing or hemoptysis.    Gastrointestinal: No new nausea or vomiting, melena or blood in stools.    Kidney/Bladder: Incontinence of urine increased frequency of urination                             Genital/Sexual: No new Sex function Changes                                Skin: No new rash    Muscles/Joints/Bones: No changes in muscles / joint swelling     Review of systems otherwise negative as requested from patient, except   Those positive ROS outlined and discussed in [place].      VITALS:      Physical Exam:  Pleasant she is in no distress thyroid prep nontender without  "nodules  Right upper back 3 x 4 mm mixed pigmented lesion compound nevus  Right cheek 1 cm x 1 cm lightly brown pigmented lesion    Lungs are clear  She has some slight wheezing left base  No crackles        Vitals:    01/03/20 0912   BP: 116/78   Patient Site: Left Arm   Patient Position: Sitting   Cuff Size: Adult Regular   Pulse: 76   Weight: 133 lb 12 oz (60.7 kg)   Height: 5' 3\" (1.6 m)     Wt Readings from Last 3 Encounters:   01/03/20 133 lb 12 oz (60.7 kg)   10/08/19 132 lb (59.9 kg)   08/08/19 136 lb (61.7 kg)     Body mass index is 23.69 kg/m .    PFSH:    Social History     Tobacco Use   Smoking Status Never Smoker   Smokeless Tobacco Never Used       Family History   Problem Relation Age of Onset     Breast cancer Mother 67     Hypertension Father      Colon cancer Paternal Grandmother 80     Breast cancer Maternal Grandmother        Social History     Socioeconomic History     Marital status: Single     Spouse name: Not on file     Number of children: Not on file     Years of education: Not on file     Highest education level: Not on file   Occupational History     Not on file   Social Needs     Financial resource strain: Not on file     Food insecurity:     Worry: Not on file     Inability: Not on file     Transportation needs:     Medical: Not on file     Non-medical: Not on file   Tobacco Use     Smoking status: Never Smoker     Smokeless tobacco: Never Used   Substance and Sexual Activity     Alcohol use: Yes     Comment: once every 2 weeks     Drug use: No     Sexual activity: Not on file   Lifestyle     Physical activity:     Days per week: Not on file     Minutes per session: Not on file     Stress: Not on file   Relationships     Social connections:     Talks on phone: Not on file     Gets together: Not on file     Attends Buddhism service: Not on file     Active member of club or organization: Not on file     Attends meetings of clubs or organizations: Not on file     Relationship status: Not " on file     Intimate partner violence:     Fear of current or ex partner: Not on file     Emotionally abused: Not on file     Physically abused: Not on file     Forced sexual activity: Not on file   Other Topics Concern     Not on file   Social History Narrative     Not on file       Past Surgical History:   Procedure Laterality Date     [place] D&C OF CERVIX STUMP      Description: Dilation And Curettage Of Cervical Stump;  Recorded: 09/08/2013;       Allergies   Allergen Reactions     Penicillins      Tolmetin      Other reaction(s): Other, see comments  PN: has stomach issues, contraindicated       Active Ambulatory Problems     Diagnosis Date Noted     Herpes Simplex Type I      Genital Warts      Common Migraine (Without Aura)      Allergic Rhinitis      Anxiety Disorder NOS      Irritable Bowel Syndrome      Hypothyroidism due to Hashimoto's thyroiditis 05/02/2017     Vitamin D deficiency 05/02/2017     Elevated Saccharomyces cerevisiae antibody level IgA  2017 05/02/2017     Resolved Ambulatory Problems     Diagnosis Date Noted     Viral Syndrome      Diarrhea      No Additional Past Medical History         MEDICATIONS:  Current Outpatient Medications   Medication Sig Dispense Refill     ALPRAZolam (XANAX) 0.5 MG tablet Take 1 tablet (0.5 mg total) by mouth 3 (three) times a day as needed for sleep or anxiety. 30 tablet 0     cholecalciferol, vitamin D3, (VITAMIN D3) 2,000 unit cap Take by mouth.       sertraline (ZOLOFT) 100 MG tablet Take 1 tablet (100 mg total) by mouth daily. 90 tablet 2     thyroid, pork, (NP THYROID) 15 mg Tab Take 1 tablet (15 mg total) by mouth Daily at 6:00 am. 90 tablet 3     UNABLE TO FIND 2 capsules daily. Med Name: (CuraPro) Powerful Antioxidant        No current facility-administered medications for this visit.               I spent 20  minutes with this patient face to face, of which 50% or greater was spent in counseling and coordination of care with regards to Tiffanie was seen  today for hypothyroidism.    Diagnoses and all orders for this visit:    Hypothyroidism due to Hashimoto's thyroiditis  -     Thyroid Stimulating Hormone (TSH)  -     T3, Total  -     T4, Free    Frequency of urination  -     Ambulatory referral to Urology    Urinary incontinence, unspecified type  -     Ambulatory referral to Urology        Valdo Pederson MD  [facility] [address]  [phone]

## 2021-06-05 VITALS
SYSTOLIC BLOOD PRESSURE: 124 MMHG | BODY MASS INDEX: 25.74 KG/M2 | HEART RATE: 65 BPM | WEIGHT: 145.31 LBS | OXYGEN SATURATION: 100 % | DIASTOLIC BLOOD PRESSURE: 76 MMHG | TEMPERATURE: 98.1 F

## 2021-06-07 NOTE — TELEPHONE ENCOUNTER
Medication: ALPRAZolam (XANAX) 0.5 MG tablet  Pharmacy: [facility] in [place]   Last OV: 1/3/20 with Dr. Pederson  Last refill: 8/8/19, #30 with 0 refills authorized by Dr. Pederson     [facility], [facility]

## 2021-06-07 NOTE — TELEPHONE ENCOUNTER
Controlled Substance Refill Request  Medication Name:   Requested Prescriptions     Pending Prescriptions Disp Refills     ALPRAZolam (XANAX) 0.5 MG tablet [Pharmacy Med Name: ALPRAZOLAM 0.5MG TABLETS] 30 tablet 0     Sig: TAKE 1 TABLET(0.5 MG) BY MOUTH THREE TIMES DAILY AS NEEDED FOR SLEEP OR ANXIETY     Date Last Fill: 8/8/19  [facility]ed [facility]: [facility]  Submit electronically to pharmacy  Controlled Substance Agreement on file:   Encounter-Level CSA Scan Date:    There are no encounter-level csa scan date.        Last office visit:  1/3/20

## 2021-06-10 NOTE — PROGRESS NOTES
CHIEF COMPLAINT: Tiffanie Ye had concerns including Hypertension and Stress.    [place]: 1.............. had concerns including Hypertension and Stress.    1. Irritable bowel syndrome    2. Joint ache    3. Anxiety    4. Vitamin D deficiency    5. Screening for deficiency anemia    6. Coated tongue      No problem-specific Assessment & Plan notes found for this encounter.      CC:              Pt states high blood pressure issues      Right and Left     Low Iron, Vitamin D and B12     Fmhx  HTN       IBS:  GLuten/Soy Free  NSAID    Simple        No Probitotics  No Fish Oil     Joint Shoulder ++ Deer Ticks        What's it like:  Pt states no sx with high blood   How long is it ongoing:      Since January 2017  What makes it worse :   Pt not sure what causes high BP level. Pt  concern that high blood sure is due to family history and/or combination of sx listed below.   What makes it better:     Pt is not taking anything for pain or nausea, sx come and go.   0/10-10/10:Pain/Intesity     3 pain level for shoulder pain     Associated Sx:  Nausea, feeling flush, stress, shoulder and muscle pain         SUBJECTIVE:  Tiffanie Ye is a 41 y.o. female    No past medical history on file.  Past Surgical History:   Procedure Laterality Date     [place] D&C OF CERVIX STUMP      Description: Dilation And Curettage Of Cervical Stump;  Recorded: 09/08/2013;     Penicillins  Current Outpatient Prescriptions   Medication Sig Dispense Refill     levonorgestrel-ethinyl estradiol (SRONYX) 0.1-20 mg-mcg per tablet Take 1 tablet by mouth daily.       LACTOBACILLUS ACIDOPHILUS (PROBIOTIC ORAL) Take by mouth daily.       VITAMIN B COMPLEX (B COMPLEX ORAL) Take by mouth daily.       No current facility-administered medications for this visit.      Family History   Problem Relation Age of Onset     Hypertension Father      Colon cancer Paternal Grandmother 80     Social History     Social History     Marital status: Single     Spouse name: N/A      Number of children: N/A     Years of education: N/A     Social History Main Topics     Smoking status: Never Smoker     Smokeless tobacco: Never Used     Alcohol use Yes      Comment: once every 2 weeks     Drug use: No     Sexual activity: Not Asked     Other Topics Concern     None     Social History Narrative     Patient Active Problem List   Diagnosis     Herpes Simplex Type I     Genital Warts     Common Migraine (Without Aura)     Allergic Rhinitis     Viral Syndrome     Anxiety Disorder NOS     Diarrhea     Irritable Bowel Syndrome                                              SOCIAL: She  reports that she has never smoked. She has never used smokeless tobacco. She reports that she drinks alcohol. She reports that she does not use illicit drugs.    REVIEW OF SYSTEMS:     Family reviewed and not pertinent to presenting issue   Review of systems otherwise negative as requested from patient, except   Positive ROS outlined and discussed in [place].    OBJECTIVE:  /78 (Patient Site: Right Arm, Patient Position: Sitting, Cuff Size: Adult Regular)  Pulse 98  Resp 24  Wt 141 lb 4 oz (64.1 kg)  SpO2 99%  Breastfeeding? No  BMI 25.83 kg/m2    GENERAL:     No acute distress.   Alert and oriented X 3         Physical:    Tongue is coated white  TMs are clear  Thyroid pump nontender without nodules  Lungs are clear  Cardiac no murmur  Slightly tender SI joints with mobile inflammatory nodules  Full range of motion of the shoulder mild bicep tendon tenderness  No joint deformities  No skin rash  Tattoos  Compound nevus right upper shoulder 4 mm        ASSESSMENT & PLAN      Tiffanie was seen today for hypertension and stress.    Diagnoses and all orders for this visit:    Irritable bowel syndrome  -     C -Reactive Protein, High Sensitivity  -     Inflammatory Bowel Disease (IBD) Serology Panel  -     H. pylori Antibody, IgG  -     Calprotectin ($$$); Future    Joint ache  -     Thyroid Peroxidase Antibody  -      Thyroid Cascade  -     Lyme Disease Antibody Confirmation  -     Babesia microti Antibody IgG  -     C -Reactive Protein, High Sensitivity  -     Inflammatory Bowel Disease (IBD) Serology Panel    Anxiety  -     Thyroid Peroxidase Antibody  -     Thyroid Cascade    Vitamin D deficiency    Screening for deficiency anemia  -     Vitamin D, Total (25-Hydroxy)  -     Magnesium  -     Iron and Transferrin Iron Binding Capacity    Coated tongue  -     Culture, Yeast      Return if symptoms worsen or fail to improve.   B RIght  [place]  Vitamin 5000 IU       Consider    FIsh Oil or Omega 3 Flax  2000 mg daily  Vitamin A 5000 IU  Vitamin D 5000 IU   Zinc Carnosine 40 mg Daily  Glutamine 2 mg-4 mg with meals  Quercitin + Vitamin C   Green Tea Daily  Curcumin (Turmeric)  Resveratrol  Pao  Oregano  Thyme    Berberine 500 mg Daily for 30 days  Inulin or Fructoligosaccharides (Chicory Root) 15 grams  Psyllium 10 gram daily    Probiotic Multistrain (50CFU) with S. Boulardii (15 CFU)    Wellness Advice    Eat Whole Foods with High Nutritional Value  -----High Fats Nuts, Olive Oil, Fish, Avocados  -----Lean Meats  -----Vegetables Colorful and In abundance  -----Fresh fruits  -----Beans, Legumes  and Whole Grains    Engage in Moderate Exercise  -----30-60 minutes daily    Get Intentional Restorative Sleep  -----8-10 hours    Cultivate and Maintain Healthy Relationships  -----Family and Friends  -----Work Place  -----Community      Remove and Harmful Factors  -----Stressors  Work and Home   -----Toxins:  Tobacco, Alcohol in Excess, Processed Sugars (White Stuff and High Fructose Corn Syrup, Pollutants (Air and Water)      Be Present and Mindful for Yourself and Family  -----Laugh Together  -----Talk Together and Listen to your Body and Family and Friends  -----Enjoy Meals together              Anticipatory Guidance and Symptomatic Cares Discussed   Advised to call back directly if there are further questions, or if these symptoms  fail to improve as anticipated or worsen.  Return to clinic if patient has a clinical concern that warrants an exam.        30Min Total Time, > 50% counseling and coordination of Care    Valdo Pederson MD  Family Medicine   [facility] [address] [phone]

## 2021-06-10 NOTE — PROGRESS NOTES
CHIEF COMPLAINT: Tiffanie Ye had concerns including Follow-up.    [place]: 1.............. had concerns including Follow-up.    1. Hypothyroidism due to Hashimoto's thyroiditis    2. Vitamin D deficiency    3. Elevated Saccharomyces cerevisiae antibody level IgA  2017      No problem-specific Assessment & Plan notes found for this encounter.      CC:              F/u lab results and discuss potential new med     Lately   No palpable nodules     Thyroid     Recent Results (from the past 240 hour(s))   Thyroid Peroxidase Antibody   Result Value Ref Range    Thyroid Peroxidase Ab 916.9 (H) 0.0 - 5.6 IU/mL   Thyroid Cascade   Result Value Ref Range    TSH 6.29 (H) 0.30 - 5.00 uIU/mL   Lyme Disease Antibody Confirmation   Result Value Ref Range    Lyme Ab, IgG Blot Negative Negative    Lyme Ab, IgG Band(s) No bands detected     Lyme Ab, IgM Blot  Negative Negative    Lyme Ab, IgM Band(s) No bands detected     Lyme Immuno Blot Interpretation     Babesia microti Antibody IgG   Result Value Ref Range    BABESIA MICROTI IGG AB, S <1:64 <1:64 titer   C -Reactive Protein, High Sensitivity   Result Value Ref Range    CRP, High Sensitivity 1.2 0.0 - 3.0 mg/L   Inflammatory Bowel Disease (IBD) Serology Panel   Result Value Ref Range    Saccharomyces cerevisiae Antibody IgA 20.9 (H) <=20.0 (Negative) U    Saccharomyces cerevisiae Antibody IgG <10.0 <=20.0 (Negative) U    Neutrophil Specific Antibodies Negative Negative   T4, Free   Result Value Ref Range    Free T4 0.9 0.7 - 1.8 ng/dL   Vitamin D, Total (25-Hydroxy)   Result Value Ref Range    Vitamin D, Total (25-Hydroxy) 21.1 (L) 30.0 - 80.0 ng/mL   Magnesium   Result Value Ref Range    Magnesium 2.2 1.8 - 2.6 mg/dL   Iron and Transferrin Iron Binding Capacity   Result Value Ref Range    Iron 70 42 - 175 ug/dL    Transferrin 379 (H) 212 - 360 mg/dL    Transferrin Saturation, Calculated 15 (L) 20 - 50 %    Transferrin IBC, Calculated 474 313 - 563 ug/dL   Culture, Yeast   Result  Value Ref Range    Culture No yeast isolated    H. pylori Antibody, IgG   Result Value Ref Range    H Pylori IgG 0.71 <1.00 EV         What's it like:                      none  How long is it ongoing:      none  What makes it worse :       none  What makes it better:         none  0/10-10/10:Pain/Intesity     0      Associated Sx:   none        SUBJECTIVE:  Tiffanie Ye is a 41 y.o. female    No past medical history on file.  Past Surgical History:   Procedure Laterality Date     [place] D&C OF CERVIX STUMP      Description: Dilation And Curettage Of Cervical Stump;  Recorded: 09/08/2013;     Penicillins  Current Outpatient Prescriptions   Medication Sig Dispense Refill     levonorgestrel-ethinyl estradiol (SRONYX) 0.1-20 mg-mcg per tablet Take 1 tablet by mouth daily.       thyroid, pork, (ARMOUR THYROID) 30 mg Tab Take 1 tablet (30 mg total) by mouth Daily at 6:00 am. 90 tablet 1     No current facility-administered medications for this visit.      Family History   Problem Relation Age of Onset     Hypertension Father      Colon cancer Paternal Grandmother 80     Social History     Social History     Marital status: Single     Spouse name: N/A     Number of children: N/A     Years of education: N/A     Social History Main Topics     Smoking status: Never Smoker     Smokeless tobacco: Never Used     Alcohol use Yes      Comment: once every 2 weeks     Drug use: No     Sexual activity: Not Asked     Other Topics Concern     None     Social History Narrative     Patient Active Problem List   Diagnosis     Herpes Simplex Type I     Genital Warts     Common Migraine (Without Aura)     Allergic Rhinitis     Viral Syndrome     Anxiety Disorder NOS     Diarrhea     Irritable Bowel Syndrome     Hypothyroidism due to Hashimoto's thyroiditis     Vitamin D deficiency     Elevated Saccharomyces cerevisiae antibody level IgA  2017                                              SOCIAL: She  reports that she has never smoked. She has  never used smokeless tobacco. She reports that she drinks alcohol. She reports that she does not use illicit drugs.    REVIEW OF SYSTEMS:     Family reviewed and not pertinent to presenting issue   Review of systems otherwise negative as requested from patient, except   Positive ROS outlined and discussed in [place].    OBJECTIVE:  /64 (Patient Site: Left Arm, Patient Position: Sitting, Cuff Size: Adult Regular)  Pulse (!) 53  Resp 20  Wt 142 lb (64.4 kg)  SpO2 99%  Breastfeeding? No  BMI 25.97 kg/m2    GENERAL:     No acute distress.   Alert and oriented X 3         Physical:    Thyroid palpable no appreciable nodules full range of affect  No lid lag  No tremulous  Normal arsh of speech        ASSESSMENT & PLAN      Tiffanie was seen today for follow-up.    Diagnoses and all orders for this visit:    Hypothyroidism due to Hashimoto's thyroiditis  -     T3, Total; Standing  -     Thyroid Stimulating Hormone (TSH); Standing  -     T4, Free; Standing    Vitamin D deficiency  -     Vitamin D, Total (25-Hydroxy); Future    Elevated Saccharomyces cerevisiae antibody level IgA  2017    Other orders  -     thyroid, pork, (ARMOUR THYROID) 30 mg Tab; Take 1 tablet (30 mg total) by mouth Daily at 6:00 am.        Tiffanie is already doing dietary interventions  Read about thyroid disease and Hashimoto's thyroiditis  Already avoiding gluten  She is okay with thyroid [place] supplementation  Goal TSH 1.0  Follow T3 as well as free T4  Recheck thyroid  peroxidase antibodies in 1 year vitamin D supplementation to shoot for a level of 60    Return in about 4 weeks (around 5/30/2017).       Anticipatory Guidance and Symptomatic Cares Discussed   Advised to call back directly if there are further questions, or if these symptoms fail to improve as anticipated or worsen.  Return to clinic if patient has a clinical concern that warrants an exam.        25  Min Total Time, > 50% counseling and coordination of Care    Valdo RODRIGUEZ  [facility]  Family Medicine   [facility] [address]  [phone]

## 2021-06-11 NOTE — PROGRESS NOTES
CHIEF COMPLAINT: Tiffanie Ye had concerns including Allergies.    [place]: 1.............. had concerns including Allergies.    1. Hypothyroid    2. Anxiety    3. PTSD (post-traumatic stress disorder)      No problem-specific Assessment & Plan notes found for this encounter.      CC:              Anxiety d/t thyroid medication      Anxiety high  Waking up anxious  Did not want to yell at partner  Anxiety intense daily  Tearing   Hard to get there  Hard to stay focused.      PTSD   Panic yesterday      Tight chest   out-of-control      What's it like:                      Severe anxiety  How long is it ongoin weeks  What makes it worse :       Mornings are bad  What makes it better:         Nothing  0/10-10/10:Pain/Intesity    severe      Associated Sx: Tightness in her chest feeling of panic        SUBJECTIVE:  Tiffanie Ye is a 41 y.o. female    No past medical history on file.  Past Surgical History:   Procedure Laterality Date     [place] D&C OF CERVIX STUMP      Description: Dilation And Curettage Of Cervical Stump;  Recorded: 2013;     Penicillins  Current Outpatient Prescriptions   Medication Sig Dispense Refill     cholecalciferol, vitamin D3, (VITAMIN D3) 2,000 unit cap Take by mouth.       LACTOSE-REDUCED FOOD (NUTRITIONAL SUPPLEMENT ORAL) Take by mouth. Whole wheat basil       levonorgestrel-ethinyl estradiol (SRONYX) 0.1-20 mg-mcg per tablet Take 1 tablet by mouth daily.       levothyroxine (SYNTHROID, LEVOTHROID) 50 MCG tablet Take 1 tablet (50 mcg total) by mouth daily. Replaces [place] THyroid 90 tablet 0     OMEGA-3/DHA/EPA/FISH OIL (FISH OIL-OMEGA-3 FATTY ACIDS) 300-1,000 mg capsule Take 2 g by mouth daily.       No current facility-administered medications for this visit.      Family History   Problem Relation Age of Onset     Hypertension Father      Colon cancer Paternal Grandmother 80     Social History     Social History     Marital status: Single     Spouse name: N/A     Number of  "children: N/A     Years of education: N/A     Social History Main Topics     Smoking status: Never Smoker     Smokeless tobacco: Never Used     Alcohol use Yes      Comment: once every 2 weeks     Drug use: No     Sexual activity: Not Asked     Other Topics Concern     None     Social History Narrative     Patient Active Problem List   Diagnosis     Herpes Simplex Type I     Genital Warts     Common Migraine (Without Aura)     Allergic Rhinitis     Viral Syndrome     Anxiety Disorder NOS     Diarrhea     Irritable Bowel Syndrome     Hypothyroidism due to Hashimoto's thyroiditis     Vitamin D deficiency     Elevated Saccharomyces cerevisiae antibody level IgA  2017                                              SOCIAL: She  reports that she has never smoked. She has never used smokeless tobacco. She reports that she drinks alcohol. She reports that she does not use illicit drugs.    REVIEW OF SYSTEMS:     Family reviewed and not pertinent to presenting issue   Review of systems otherwise negative as requested from patient, except   Positive ROS outlined and discussed in [place].    OBJECTIVE:  /68  Pulse 72  Resp 16  Ht 5' 2\" (1.575 m)  Wt 133 lb (60.3 kg)  SpO2 99%  BMI 24.33 kg/m2    GENERAL:     No acute distress.   Alert and oriented X 3         Physical:    Slightly anxious looking affect  Lungs clear  Cardiac S1-S2 regular sinus no appreciable murmur        ASSESSMENT & PLAN      Tiffanie was seen today for allergies.    Diagnoses and all orders for this visit:    Hypothyroid  -     Thyroid Cascade  -     T3, Total  -     T4, Free  -     US Thyroid; Future    Anxiety  -     Ambulatory referral to Psychology    PTSD (post-traumatic stress disorder)  -     Ambulatory referral to Psychology    I asked Tiffanie to consider a leave of absence  I would like her to follow-up with serial Simons in our clinic for individual psychotherapy  I will do a thyroid ultrasound to evaluate for any underlying nodules  We will " consider the addition of an SSRI currently she wishes to do no daily medication  I asked her to consider melatonin 5-10 mg at target bedtime  I asked her to consider [place]'s wort 3-400 mg 3 times a day standard dosing  My choice for her for an SSRI would be sertraline starting at 25 mg and increasing as symptoms respond  We talked about mindfulness nutrition activity sleep      No Follow-up on file.       Anticipatory Guidance and Symptomatic Cares Discussed   Advised to call back directly if there are further questions, or if these symptoms fail to improve as anticipated or worsen.  Return to clinic if patient has a clinical concern that warrants an exam.        30  Min Total Time, > 50% counseling and coordination of Care    Valdo Pederson MD  Family Medicine   [facility] [id number] [phone]

## 2021-06-11 NOTE — TELEPHONE ENCOUNTER
Refill Approved    Rx renewed per Medication Renewal Policy. Medication was last renewed on 9/15/19.    Tisha Cook, [facility] Triage/Med Refill 9/8/2020     Requested Prescriptions   Pending Prescriptions Disp Refills     sertraline (ZOLOFT) 100 MG tablet [Pharmacy Med Name: SERTRALINE 100MG TABLETS] 90 tablet 2     Sig: TAKE 1 TABLET(100 MG) BY MOUTH DAILY       SSRI Refill Protocol  Passed - 9/6/2020  9:52 PM        Passed - PCP or prescribing provider visit in last year     Last office visit with prescriber/PCP: 1/3/2020 Valdo Pederson MD OR same dept: 1/3/2020 Valdo Pederson MD OR same specialty: 1/3/2020 Valdo Pederson MD  Last physical: Visit date not found Last MTM visit: Visit date not found   Next visit within 3 mo: Visit date not found  Next physical within 3 mo: Visit date not found  Prescriber OR PCP: Valdo Pederson MD  Last diagnosis associated with med order: 1. Anxiety Disorder NOS  - sertraline (ZOLOFT) 100 MG tablet [Pharmacy Med Name: SERTRALINE 100MG TABLETS]; TAKE 1 TABLET(100 MG) BY MOUTH DAILY  Dispense: 90 tablet; Refill: 2    If protocol passes may refill for 12 months if within 3 months of last provider visit (or a total of 15 months).

## 2021-06-13 NOTE — PATIENT INSTRUCTIONS - HE
So great to see you today Tiffanie    Neck steps and follow-up    I will certify you for medical cannabis with a diagnosis of PTSD  They will contact you through email  You register are certified and then fill your prescriptions through [facility] and through the state    Thyroid we will check levels today    Continue to stay active  Increase fiber intake which is healthy for not only lowering cholesterol but for the good bacteria in your body    See one of our midwives for GYN care  Kathleen Richardson would be to excellent midwife folks for you to see    [place]
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
## 2021-06-13 NOTE — TELEPHONE ENCOUNTER
Due to be seen    Rx renewed per Medication Renewal Policy. Medication was last renewed on 9/8/20.    Tisha Cook, [facility] Triage/Med Refill 12/21/2020     Requested Prescriptions   Pending Prescriptions Disp Refills     sertraline (ZOLOFT) 100 MG tablet [Pharmacy Med Name: SERTRALINE 100MG TABLETS] 90 tablet 0     Sig: TAKE 1 TABLET(100 MG) BY MOUTH DAILY       SSRI Refill Protocol  Passed - 12/18/2020 12:31 PM        Passed - PCP or prescribing provider visit in last year     Last office visit with prescriber/PCP: 1/3/2020 Valdo Pederson MD OR same dept: 1/3/2020 Valdo Pederson MD OR same specialty: 1/3/2020 Valdo Pederson MD  Last physical: Visit date not found Last MTM visit: Visit date not found   Next visit within 3 mo: Visit date not found  Next physical within 3 mo: Visit date not found  Prescriber OR PCP: Valdo Pederson MD  Last diagnosis associated with med order: 1. Anxiety Disorder NOS  - sertraline (ZOLOFT) 100 MG tablet [Pharmacy Med Name: SERTRALINE 100MG TABLETS]; TAKE 1 TABLET(100 MG) BY MOUTH DAILY  Dispense: 90 tablet; Refill: 0    If protocol passes may refill for 12 months if within 3 months of last provider visit (or a total of 15 months).                             Patient/Caregiver provided printed discharge information.

## 2021-06-15 PROBLEM — E55.9 VITAMIN D DEFICIENCY: Status: ACTIVE | Noted: 2017-05-02

## 2021-06-15 PROBLEM — E06.3 HYPOTHYROIDISM DUE TO HASHIMOTO'S THYROIDITIS: Status: ACTIVE | Noted: 2017-05-02

## 2021-06-15 PROBLEM — R76.8 ELEVATED SACCHAROMYCES CEREVISIAE ANTIBODY LEVEL: Status: ACTIVE | Noted: 2017-05-02

## 2021-06-16 PROBLEM — F43.10 PTSD (POST-TRAUMATIC STRESS DISORDER): Status: ACTIVE | Noted: 2020-12-22

## 2021-06-16 NOTE — PROGRESS NOTES
ASSESSMENT & PLAN      Tiffanie was seen today for hypothyroidism.    Diagnoses and all orders for this visit:    Hypothyroidism due to Hashimoto's thyroiditis  -     Triiodothyronine (T3), Reverse  -     T3 (Triiodothyronine), Free  -     T4, Total  -     Thyroid Peroxidase Antibody  -     Thyroid Stimulating Hormone (TSH)  -     Thyroid-Stimulating Immunoglobulin (TSI)    Irritable bowel syndrome    Other orders  -     ALPRAZolam (XANAX) 0.5 MG tablet; Take 1 tablet (0.5 mg total) by mouth 3 (three) times a day as needed for sleep or anxiety.      No Follow-up on file.           CHIEF COMPLAINT: Tiffanie Ye had concerns including Hypothyroidism.    [place]: 1.............. had concerns including Hypothyroidism.    1. Hypothyroidism due to Hashimoto's thyroiditis    2. Irritable bowel syndrome      No problem-specific Assessment & Plan notes found for this encounter.      CC:        Patient treated for small bowel intestinal overgrowth  Treated with berberine and what sounds like other herbs  Doing a little bit better  Main symptom was loose stools IBS type symptoms with diarrhea  Continues to have a stressful life  She is not involved in yoga currently  Her partner's child moved out this is been a source of stress relief  Work is still pretty stressful  She seen a natural path  Currently taking actually gone and turmeric  As well as may glycinate  She is trying to do some stress relaxation and meditation but continues to have some episodes of anxiety and she is using Xanax in the past and this is been helpful  Typically uses a 1-2 times a month  Currently doing a FO DM AP diet diet    What's it like:                     Anxiety  How long is it ongoing:      Off for years  What makes it worse :       Stressful situations  What makes it better:        Mindfulness  techniques and relaxation  0/10-10/10:Pain/Intesity    mild to moderate      Any associated Sx to above complaint:   Does have issues with sleep she tends  to have multiple awakenings we talked about cortisol testing        SUBJECTIVE:  Tiffanie Ye is a 42 y.o. female    No past medical history on file.  Past Surgical History:   Procedure Laterality Date     [place] D&C OF CERVIX STUMP      Description: Dilation And Curettage Of Cervical Stump;  Recorded: 09/08/2013;     Penicillins  Current Outpatient Prescriptions   Medication Sig Dispense Refill     ashwagandha root extract,bulk, 2.5 % Powd Use 2 capsules As Directed daily.       MAGNESIUM GLYCINATE ORAL Take 4 capsules by mouth daily. 120 mg (29%)       UNABLE TO FIND 2 capsules daily. Med Name: (CuraPro) Powerful Antioxidant        ALPRAZolam (XANAX) 0.5 MG tablet Take 1 tablet (0.5 mg total) by mouth 3 (three) times a day as needed for sleep or anxiety. 30 tablet 0     cholecalciferol, vitamin D3, (VITAMIN D3) 2,000 unit cap Take by mouth.       LACTOSE-REDUCED FOOD (NUTRITIONAL SUPPLEMENT ORAL) Take by mouth. Whole wheat basil       levonorgestrel-ethinyl estradiol (SRONYX) 0.1-20 mg-mcg per tablet Take 1 tablet by mouth daily.       levothyroxine (SYNTHROID, LEVOTHROID) 50 MCG tablet Take 1 tablet (50 mcg total) by mouth daily. Replaces [place] THyroid 90 tablet 0     OMEGA-3/DHA/EPA/FISH OIL (FISH OIL-OMEGA-3 FATTY ACIDS) 300-1,000 mg capsule Take 2 g by mouth daily.       No current facility-administered medications for this visit.      Family History   Problem Relation Age of Onset     Hypertension Father      Colon cancer Paternal Grandmother 80     Social History     Social History     Marital status: Single     Spouse name: N/A     Number of children: N/A     Years of education: N/A     Social History Main Topics     Smoking status: Never Smoker     Smokeless tobacco: Never Used     Alcohol use Yes      Comment: once every 2 weeks     Drug use: No     Sexual activity: Not Asked     Other Topics Concern     None     Social History Narrative     Patient Active Problem List   Diagnosis     Herpes Simplex Type I  "    Genital Warts     Common Migraine (Without Aura)     Allergic Rhinitis     Viral Syndrome     Anxiety Disorder NOS     Diarrhea     Irritable Bowel Syndrome     Hypothyroidism due to Hashimoto's thyroiditis     Vitamin D deficiency     Elevated Saccharomyces cerevisiae antibody level IgA  2017                                              SOCIAL: She  reports that she has never smoked. She has never used smokeless tobacco. She reports that she drinks alcohol. She reports that she does not use illicit drugs.    REVIEW OF SYSTEMS:   Family history not pertinent to chief complaint or presenting problem    Review of systems otherwise negative as requested from patient, except   Those positive ROS outlined and discussed in [place].    OBJECTIVE:  /68 (Patient Site: Left Arm, Patient Position: Sitting, Cuff Size: Adult Regular)  Pulse 80  Ht 5' 2\" (1.575 m)  Wt 127 lb 8 oz (57.8 kg)  BMI 23.32 kg/m2    GENERAL:     No acute distress.   Alert and oriented X 3         Physical:    Mild rosacea of the face  Thyroid prep nontender without nodules  Lungs are clear  Cardiac S1-S2 regular sinus no appreciable murmur  She has no tremulousness        ASSESSMENT & PLAN      Tiffanie was seen today for hypothyroidism.    Diagnoses and all orders for this visit:    Hypothyroidism due to Hashimoto's thyroiditis  -     Triiodothyronine (T3), Reverse  -     T3 (Triiodothyronine), Free  -     T4, Total  -     Thyroid Peroxidase Antibody  -     Thyroid Stimulating Hormone (TSH)  -     Thyroid-Stimulating Immunoglobulin (TSI)    Irritable bowel syndrome    Other orders  -     ALPRAZolam (XANAX) 0.5 MG tablet; Take 1 tablet (0.5 mg total) by mouth 3 (three) times a day as needed for sleep or anxiety.          No Follow-up on file.       Anticipatory Guidance and Symptomatic Cares Discussed   Advised to call back directly if there are further questions, or if these symptoms fail to improve as anticipated or worsen.  Return to clinic " if patient has a clinical concern that warrants an exam.        30 Min Total Time, > 50% counseling and coordination of Care    Valdo Pederson MD  Family Medicine   [place] [phone] [phone]

## 2021-06-16 NOTE — TELEPHONE ENCOUNTER
Refill Approved    Rx renewed per Medication Renewal Policy. Medication was last renewed on 12/21/2020.  Last office visit was 12/22/2020 with PCP.    Ena Hood, [facility] Triage/Med Refill 4/8/2021     Requested Prescriptions   Pending Prescriptions Disp Refills     sertraline (ZOLOFT) 100 MG tablet 90 tablet 0     Sig: Take 1 tablet (100 mg total) by mouth daily.       SSRI Refill Protocol  Passed - 4/7/2021  2:44 PM        Passed - PCP or prescribing provider visit in last year     Last office visit with prescriber/PCP: 12/22/2020 Valdo Pederson MD OR same dept: 12/22/2020 Valdo Pederson MD OR same specialty: 12/22/2020 Valdo Pederson MD  Last physical: Visit date not found Last MTM visit: Visit date not found   Next visit within 3 mo: Visit date not found  Next physical within 3 mo: Visit date not found  Prescriber OR PCP: Valdo Pederson MD  Last diagnosis associated with med order: 1. Anxiety Disorder NOS  - sertraline (ZOLOFT) 100 MG tablet; Take 1 tablet (100 mg total) by mouth daily.  Dispense: 90 tablet; Refill: 0    If protocol passes may refill for 12 months if within 3 months of last provider visit (or a total of 15 months).

## 2021-06-17 ENCOUNTER — RECORDS - HEALTHEAST (OUTPATIENT)
Dept: MAMMOGRAPHY | Facility: CLINIC | Age: 46
End: 2021-06-17

## 2021-06-17 DIAGNOSIS — Z12.31 ENCOUNTER FOR SCREENING MAMMOGRAM FOR MALIGNANT NEOPLASM OF BREAST: ICD-10-CM

## 2021-06-18 NOTE — PROGRESS NOTES
FEMALE PREVENTATIVE EXAM    Assessment and Plan:     1. Annual physical exam  Well 41 y/o female. Declines routine labs, STD screening today. Pap up to date - completed Jan 2018 with [facility]. Normal with neg. HPV per patient. Plan is to repeat in 3 years r/t hx abnormal pap in the past. Due for repeat mammogram, pt to schedule.    2. Upper abdominal pain  Labs today. Continue to monitor symptoms. If persists, or worsens, consider additional workup and/or follow up with Dr. Ortega regarding digestive issues.   - HM1(CBC and Differential)  - Comprehensive Metabolic Panel  - Lipase  - Amylase  - Urinalysis-UC if Indicated  - HM1 (CBC with Diff)    3. Anxiety  Has been working with Dr. Ortega on supplements and mindfulness to help with anxiety and sleep. Does not wash to start SSRI at this time.     4. Hypothyroid  Not on any meds currently. Last TSH 3.51 3/12/18. Clinically euthyroid.     Next follow up:  No Follow-up on file.    Immunization Review  Adult Imm Review: No immunizations due today    I discussed the following with the patient:   Adult Healthy Living: Importance of regular exercise  Healthy nutrition      Subjective:   Chief Complaint: Tiffanie Ye is an 42 y.o. female here for a preventative health visit.     HPI:  41 y/o female past medical history as below presents today for annual physical. Immunizations up to date. Pap up to date. Due for mammogram. Reports some upper abdominal pain over the past week, has history of digestion issues, has worked with Dr. Ortega on this in the past. Reports some nausea. No history of gallstones or stomach ulcer. Does not take any NSAIDs. No diarrhea or constipation. No urinary symptoms or flank pain. No fevers or chills. Denies vaginal bleeding or discharge. No unexplained weight loss.      Hx anxiety, following with Dr. Ortega. Prefers to avoid SSRI at this time, working with supplements.     Hx hypothyroid. TSH has been stable off of  "medications.       Healthy Habits  Are you taking a daily aspirin? No  Do you typically exercising at least 40 min, 3-4 times per week?  Yes  Do you usually eat at least 4 servings of fruit and vegetables a day, include whole grains and fiber and avoid regularly eating high fat foods? Yes    Safety Screen  Do you feel you are safe where you are living?: Yes (6/19/2018  2:22 PM)  Do you feel you are safe in your relationship(s)?: Yes (6/19/2018  2:22 PM)    Review of Systems:  Please see above.  The rest of the review of systems are negative for all systems.     Pap History:   No - age 30-65 PAP every 3 years recommended  Cancer Screening       Status Date      PAP SMEAR Overdue 7/25/2006           Patient Care Team:  Karyn Ortega MD as PCP - General        History     Reviewed By Date/Time Sections Reviewed    Alondra Garcia CNP 6/24/2018 10:51 PM Medical, Surgical, Tobacco, Alcohol, Drug Use, Sexual Activity, Family            Objective:   Vital Signs:   Visit Vitals     /72 (Patient Site: Left Arm, Patient Position: Sitting, Cuff Size: Adult Regular)     Pulse 74     Ht 5' 2\" (1.575 m)     Wt 123 lb (55.8 kg)     LMP 06/12/2018     SpO2 98%     BMI 22.5 kg/m2        PHYSICAL EXAM  Physical Examination: General appearance - alert, well appearing, and in no distress  Mental status - alert, oriented to person, place, and time  Eyes - pupils equal and reactive, extraocular eye movements intact  Ears - bilateral TM's and external ear canals normal  Nose - normal and patent, no erythema, discharge or polyps  Mouth - mucous membranes moist, pharynx normal without lesions  Neck - supple, no significant adenopathy  Lymphatics - no palpable lymphadenopathy, no hepatosplenomegaly  Chest - clear to auscultation, no wheezes, rales or rhonchi, symmetric air entry  Heart - normal rate, regular rhythm, normal S1, S2, no murmurs, rubs, clicks or gallops  Abdomen - soft, nontender, nondistended, no masses or " organomegaly  Breasts - breasts appear normal, no suspicious masses, no skin or nipple changes or axillary nodes  Neurological - alert, oriented, normal speech, no focal findings or movement disorder noted  Musculoskeletal - no joint tenderness, deformity or swelling  Extremities - peripheral pulses normal, no pedal edema, no clubbing or cyanosis  Skin - normal coloration and turgor, no rashes, no suspicious skin lesions noted       Medication List          These changes are accurate as of 6/19/18 11:59 PM.  If you have any questions, ask your nurse or doctor.               CONTINUE taking these medications          ALPRAZolam 0.5 MG tablet   Also known as:  XANAX   INSTRUCTIONS:  Take 1 tablet (0.5 mg total) by mouth 3 (three) times a day as needed for sleep or anxiety.           ashwagandha root extract(bulk) 2.5 % Powd   INSTRUCTIONS:  Use 2 capsules As Directed daily.           UNABLE TO FIND   INSTRUCTIONS:  2 capsules daily. Med Name: (CuraPro) Powerful Antioxidant           VITAMIN D3 2,000 unit capsule   INSTRUCTIONS:  Take by mouth.   Generic drug:  cholecalciferol (vitamin D3)             STOP taking these medications          fish oil-omega-3 fatty acids 300-1,000 mg capsule       levothyroxine 50 MCG tablet   Also known as:  SYNTHROID, LEVOTHROID       MAGNESIUM GLYCINATE ORAL       NUTRITIONAL SUPPLEMENT ORAL       SRONYX 0.1-20 mg-mcg per tablet   Generic drug:  levonorgestrel-ethinyl estradiol             Additional Screenings Completed Today:

## 2021-06-19 NOTE — LETTER
Letter by Valdo Pederson MD at      Author: Valdo Pederson MD Service: -- Author Type: --    Filed:  Encounter Date: 8/20/2019 Status: (Other)         Tiffanie Ye  [address] [facility] [address]             August 20, 2019         Dear Ms. Ye,    Below are the results from your recent visit:    Resulted Orders   T3, Total   Result Value Ref Range    T3, Total 100 45 - 175 ng/dL   Thyroid Stimulating Hormone (TSH)   Result Value Ref Range    TSH 6.67 (H) 0.30 - 5.00 uIU/mL   Thyroid Peroxidase Antibody   Result Value Ref Range    Thyroid Peroxidase Ab 849.3 (H) 0.0 - 5.6 IU/mL   T4, Free   Result Value Ref Range    Free T4 0.8 0.7 - 1.8 ng/dL   Vitamin D, Total (25-Hydroxy)   Result Value Ref Range    Vitamin D, Total (25-Hydroxy) 30.4 30.0 - 80.0 ng/mL    Narrative    Deficiency <10.0 ng/mL  Insufficiency 10.0-29.9 ng/mL  Sufficiency 30.0-80.0 ng/mL  Toxicity (possible) >100.0 ng/mL     Tiffanie this is telling us the THyroid is low.    Recheck in 6 weeks   Adjust [place] based on TSH   Goal 1.0       DanL  Please call with questions or contact us using [employer].    Sincerely,        Electronically signed by Valdo Pederson MD
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
## 2021-06-21 NOTE — LETTER
Letter by Valdo Pederson MD at      Author: Valdo Pederson MD Service: -- Author Type: --    Filed:  Encounter Date: 12/24/2020 Status: (Other)         Tiffanie Ye  [address]   [place] [address]             December 24, 2020         Dear Ms. Ye,    Below are the results from your recent visit:    Resulted Orders   T4, Free   Result Value Ref Range    Free T4 0.7 0.7 - 1.8 ng/dL   T3, Total   Result Value Ref Range    T3, Total 104 45 - 175 ng/dL   Thyroid Stimulating Hormone (TSH)   Result Value Ref Range    TSH 5.74 (H) 0.30 - 5.00 uIU/mL       Body is saying it needs more Hormone  Try to increase again: 45 g Daily?    Let me know     TSH is High      This is the Brain sensing the Thyroid Levels are low.    When thyroid levels are low, the brain produces more TSH to tell the THyroid to make more hormone.      Your level suggests that you are HYPOthyroid or your body is LOW in thyroid hormone.    This means either you need a supplement or an adjustment in the thyroid medication supplement to bring this level into balance.      When the TSH is high, it suggests that you may need to start a supplement like Levothyroxine or make an adjustment in your medication.         Please call with questions or contact us using [employer].    Sincerely,        Electronically signed by Valdo Pederson MD

## 2021-06-21 NOTE — PROGRESS NOTES
ASSESSMENT & PLAN    No problem-specific Assessment & Plan notes found for this encounter.      Tiffanie was seen today for follow-up and hormone levels.    Diagnoses and all orders for this visit:    Anxiety Disorder NOS    Hypothyroidism due to Hashimoto's thyroiditis    Other orders  -     ALPRAZolam (XANAX) 0.5 MG tablet; Take 1 tablet (0.5 mg total) by mouth 3 (three) times a day as needed for sleep or anxiety.        Patient Instructions   This website sells wholesale vitamins.  With this log on you may purchase Supplements at 35% discount.    First to locate the [email or web address] website.    There will appear of blue box that asks you to enter your provider's code.    In the blue box you enter [employer] (all caps)    Then you create your own account.  After this you may buy supplements on their website of 35% discount.    [employer]:   [email or web address]  PROVIDER CODE:  [employer]  Create your own account.    [email or web address]    Some favorite Brands are:     [employer]  Clinical Nutrients 45-Plus Women (Iron-free)                 No Follow-up on file.            CHIEF COMPLAINT: Tiffanie Ye had concerns including Follow-up (anit-depressant and anti-anxiety med. ) and Hormone levels (check).    [place]: 1.............. had concerns including Follow-up (anit-depressant and anti-anxiety med. ) and Hormone levels (check).    1. Anxiety Disorder NOS    2. Hypothyroidism due to Hashimoto's thyroiditis          CC:             Why are you here today?                              Discuss anti-depressant and anti-anxiety medication. Pt wondering if hormones are contributing to anxiety.      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 female    No past medical history on file.  Past Surgical History:   Procedure Laterality Date     CO D&C OF CERVIX STUMP      Description: Dilation And Curettage Of Cervical Stump;  Recorded: 09/08/2013;     Penicillins  Current Outpatient Medications   Medication Sig Dispense Refill     ALPRAZolam (XANAX) 0.5 MG tablet Take 1 tablet (0.5 mg total) by mouth 3 (three) times a day as needed for sleep or anxiety. 30 tablet 0     cholecalciferol, vitamin D3, (VITAMIN D3) 2,000 unit cap Take by mouth.       sertraline (ZOLOFT) 50 MG tablet Take 1 tablet (50 mg total) by mouth daily. 30 tablet 2     UNABLE TO FIND 2 capsules daily. Med Name: (CuraPro) Powerful Antioxidant        No current facility-administered medications for this visit.      Family History   Problem Relation Age of Onset     Hypertension Father      Colon cancer Paternal Grandmother 80     Social History     Socioeconomic History     Marital status: Single     Spouse name: Not on file     Number of children: Not on file     Years of education: Not on file     Highest education level: Not on file   Social Needs     Financial resource strain: Not on file     Food insecurity - worry: Not on file     Food insecurity - inability: Not on file     Transportation needs - medical: Not on file     Transportation needs - non-medical: Not on file   Occupational History     Not on file   Tobacco Use     Smoking status: Never Smoker     Smokeless tobacco: Never Used   Substance and Sexual Activity     Alcohol use: Yes     Comment: once every  "2 weeks     Drug use: No     Sexual activity: Not on file   Other Topics Concern     Not on file   Social History Narrative     Not on file     Patient Active Problem List   Diagnosis     Herpes Simplex Type I     Genital Warts     Common Migraine (Without Aura)     Allergic Rhinitis     Anxiety Disorder NOS     Diarrhea     Irritable Bowel Syndrome     Hypothyroidism due to Hashimoto's thyroiditis     Vitamin D deficiency     Elevated Saccharomyces cerevisiae antibody level IgA  2017                                              SOCIAL: She  reports that  has never smoked. she has never used smokeless tobacco. She reports that she drinks alcohol. She reports that she does not use drugs.    REVIEW OF SYSTEMS:   Family history not pertinent to chief complaint or presenting problem    Review of Systems:      Nervous System:  No headache, paresthesia or dizziness or fainting                                  Ears: No hearing loss or ringing in the ears    Eyes: No blurring of vision, Double Vision            No redness, itching or dryness.    Nose: No nosebleed or loss of smell    Mouth: No mouth sores or  coated tongue    Throat: No hoarseness or difficulty swallowing    Neck: No enlarged thyroid or lymph nodes.    Heart: No chest pain, palpitation or irregular heartbeat.                  Lungs: No shortness of breath, wheezing or hemoptysis.    Gastrointestinal: No nausea or vomiting, melena or blood in stools.    Kidney/Bladdr: No polyuria, polydipsia, or hematuria.                             Genital/Sexual: No Sex function Changes                                Skin: No rash    Muscles/Joints/Bones: No Muscle morning stiffness, No Effusion of a Joint     Review of systems otherwise negative as requested from patient, except   Those positive ROS outlined and discussed in [place].    OBJECTIVE:  /80   Pulse (!) 58   Ht 5' 2\" (1.575 m)   Wt 120 lb (54.4 kg)   SpO2 99%   BMI 21.95 kg/m      GENERAL:     No acute " distress.   Alert and oriented X 3         Physical:    Full range of affect  Thyroid palpable no appreciable nodule  No proptosis  No tremulousness  Lungs are clear  Cardiac S1-S2 regular sinus appreciable murmur gallop        ASSESSMENT & PLAN      Tiffanie was seen today for follow-up and hormone levels.    Diagnoses and all orders for this visit:    Anxiety Disorder NOS    Hypothyroidism due to Hashimoto's thyroiditis    Other orders  -     ALPRAZolam (XANAX) 0.5 MG tablet; Take 1 tablet (0.5 mg total) by mouth 3 (three) times a day as needed for sleep or anxiety.      Increase sertraline to 50 mg for 2-3 weeks then increase to 75 mg  Start good multivitamin integrative therapeutics clinical nutrients for women over 45    No Follow-up on file.       Anticipatory Guidance and Symptomatic Cares Discussed   Advised to call back directly if there are further questions, or if these symptoms fail to improve as anticipated or worsen.  Return to clinic if patient has a clinical concern that warrants an exam.         I spent 20  minutes with this patient face to face, of which 50% or greater was spent in counseling and coordination of care with regards to Tiffanie was seen today for follow-up and hormone levels.    Diagnoses and all orders for this visit:    Anxiety Disorder NOS    Hypothyroidism due to Hashimoto's thyroiditis    Other orders  -     ALPRAZolam (XANAX) 0.5 MG tablet; Take 1 tablet (0.5 mg total) by mouth 3 (three) times a day as needed for sleep or anxiety.        Valdo Pederson MD  Family Medicine   [facility] [id number] [phone]

## 2021-06-21 NOTE — LETTER
Letter by Valdo Pederson MD at      Author: Valdo Pederson MD Service: -- Author Type: --    Filed:  Encounter Date: 12/22/2020 Status: (Other)                    My Depression Action Plan  Name: Tiffanie Ye   Date of Birth 1975  Date: 12/22/2020    My Doctor: Valdo Pederson MD   My Clinic: [facility]  [address] [address]  [phone]          GREEN    ZONE   Good Control    What it looks like:     Things are going generally well. You have normal ups and downs. You may even feel depressed from time to time, but bad moods usually last less than a day.   What you need to do:  1. Continue to care for yourself (see self care plan)  2. Check your depression survival kit and update it as needed  3. Follow your physicians recommendations including any medication.  4. Do not stop taking medication unless you consult with your physician first.           YELLOW         ZONE Getting Worse    What it looks like:     Depression is starting to interfere with your life.     It may be hard to get out of bed; you may be starting to isolate yourself from others.    Symptoms of depression are starting to last most all day and this has happened for several days.     You may have suicidal thoughts but they are not constant.   What you need to do:     1. Call your care team. Your response to treatment will improve if you keep your care team informed of your progress. Yellow periods are signs an adjustment may need to be made.     2. Continue your self-care.  Just get dressed and ready for the day.  Don't give yourself time to talk yourself out of it.    3. Talk to someone in your support network.    4. Open up your depression Depression Self-Care Plan / Wellness kit.           RED    ZONE Medical Alert - Get Help    What it looks like:     Depression is seriously interfering with your life.     You may experience these or other symptoms: You cant get out of bed  most days, cant work or engage in other necessary activities, you have trouble taking care of basic hygiene, or basic responsibilities, thoughts of suicide or death that will not go away, self-injurious behavior.     What you need to do:  1. Call your care team and request a same-day appointment. If they are not available (weekends or after hours) call your local crisis line, emergency room or 911.            Self-Care Plan / Wellness Kit    Self-Care for Depression  Heres the deal. Your body and mind are really not as separate as most people think.  What you do and think affects how you feel and how you feel influences what you do and think. This means if you do things that people who feel good do, it will help you feel better.  Sometimes this is all it takes.  There is also a place for medication and therapy depending on how severe your depression is, so be sure to consult with your medical provider and/ or Behavioral Health Consultant if your symptoms are worsening or not improving.     In order to better manage my stress, I will:    Exercise  Get some form of exercise, every day. This will help reduce pain and release endorphins, the feel good chemicals in your brain. This is almost as good as taking antidepressants!  This is not the same as joining a gym and then never going! (they count on that by the way?) It can be as simple as just going for a walk or doing some gardening, anything that will get you moving.      Hygiene   Maintain good hygiene (get out of bed in the morning, make your bed, brush your teeth, take a shower, and get dressed like you were going to work, even if you are unemployed).  If your clothes don't fit try to get ones that do.    Diet  Strive to eat foods that are good for me, drink plenty of water, and avoid excessive sugar, caffeine, alcohol, and other mood-altering substances.  Some foods that are helpful in depression are: complex carbohydrates, B vitamins, flaxseed, fish or fish oil,  fresh fruits and vegetables.    Psychotherapy  Agree to participate in Individual Therapy (if recommended).    Medication  If prescribed medications, I agree to take them.  Missing doses can result in serious side effects.  I understand that drinking alcohol, or other illicit drug use, may cause potential side effects.  I will not stop my medication abruptly without first discussing it with my provider.    Staying Connected With Others  Stay in touch with my friends, family members, and my primary care provider/team.    Use your imagination  Be creative.  We all have a creative side; it doesnt matter if its oil painting, sand castles, or mud pies! This will also kick up the endorphins.    Witness Beauty  (AKA stop and smell the roses) Take a look outside, even in mid-winter. Notice colors, textures. Watch the squirrels and birds.     Service to others  Be of service to others.  There is always someone else in need.  By helping others we can get out of ourselves and remember the really important things.  This also provides opportunities for practicing all the other parts of the program.    Humor  Laugh and be silly!  Adjust your TV habits for less news and crime-drama and more comedy.    Control your stress  Try breathing deep, massage therapy, biofeedback, and meditation. Find time to relax each day.     Crisis Text Line  http://www.crisistextline.org    The Crisis Text Line serves anyone, in any type of crisis, providing access to free, 24/7 support and information via the medium people already use and trust:    Here's how it works:  1.  Text 821-060 from anywhere in the [place], anytime, about any type of crisis.  2.  A live, trained Crisis Counselor receives the text and responds quickly.  3.  The volunteer Crisis Counselor will help you move from a 'hot moment to a cool moment'.  My support system    Clinic Contact:  Phone number:    Contact 1:  Phone number:    Contact 2:  Phone number:    Gnosticism/spiritual  advisor:  Phone number:    Therapist:  Phone number:    [facility] center:    Phone number:    Other community support:  Phone number:

## 2021-06-24 NOTE — PATIENT INSTRUCTIONS - HE
Increase sertraline to 75 mg and after 3 weeks let me know if this is helpful    Ultrasound in June    Mammography 3D for dense breasts    Will get back to on results of thyroid

## 2021-06-24 NOTE — TELEPHONE ENCOUNTER
Refill Approved    Rx renewed per Medication Renewal Policy. Medication was last renewed on 9/5/18.    Tisha Cook, [facility] Triage/Med Refill 2/12/2019     Requested Prescriptions   Pending Prescriptions Disp Refills     sertraline (ZOLOFT) 50 MG tablet [Pharmacy Med Name: SERTRALINE 50MG TABLETS] 30 tablet 0     Sig: TAKE 1 TABLET BY MOUTH DAILY    [facility] Refill Protocol  Passed - 2/11/2019  3:34 AM       Passed - PCP or prescribing provider visit in last year    Last office visit with prescriber/PCP: 9/5/2018 Kris Beal MD OR same dept: 11/26/2018 Valdo Pederson MD OR same specialty: 11/26/2018 Valdo Pederson MD  Last physical: Visit date not found Last MTM visit: Visit date not found   Next visit within 3 mo: Visit date not found  Next physical within 3 mo: Visit date not found  Prescriber OR PCP: Kris Beal MD  Last diagnosis associated with med order: There are no diagnoses linked to this encounter.  If protocol passes may refill for 12 months if within 3 months of last provider visit (or a total of 15 months).

## 2021-06-24 NOTE — PROGRESS NOTES
"ASSESSMENT & PLAN    No problem-specific Assessment & Plan notes found for this encounter.      Tiffanie was seen today for thyroid problem.    Diagnoses and all orders for this visit:    Anxiety Disorder NOS    Hypothyroidism due to Hashimoto's thyroiditis  -     T3, Total  -     Thyroid Peroxidase Antibody  -     Thyroid [place]  -     US Thyroid; Future    Encounter for other screening for malignant neoplasm of breast  -     Mammo Screening Bilateral; Future        Patient Instructions   Increase sertraline to 75 mg and after 3 weeks let me know if this is helpful    Ultrasound in June    Mammography 3D for dense breasts    Will get back to on results of thyroid      Return in about 6 months (around 8/25/2019) for if your symptoms worsen despite plan of care.            CHIEF COMPLAINT: Tiffanie Ye had concerns including Thyroid Problem (check).    [place]: 1.............. had concerns including Thyroid Problem (check).    1. Anxiety Disorder NOS    2. Hypothyroidism due to Hashimoto's thyroiditis    3. Encounter for other screening for malignant neoplasm of breast          CC:             Why are you here today?                              Thyroid Check    Plus CBD plus   Vitamin D  Curcumin Librado Pro   Ashwagandha intermittently    Family hx Breast Cancer  Dense Breasts      Yoga \"will get back in it\"    Work major stress             Any other Problems in order of Priority:        SUBJECTIVE:  Tiffanie Ye is a 43 y.o. female    No past medical history on file.  Past Surgical History:   Procedure Laterality Date     MD D&C OF CERVIX STUMP      Description: Dilation And Curettage Of Cervical Stump;  Recorded: 09/08/2013;     Penicillins  Current Outpatient Medications   Medication Sig Dispense Refill     ALPRAZolam (XANAX) 0.5 MG tablet Take 1 tablet (0.5 mg total) by mouth 3 (three) times a day as needed for sleep or anxiety. 30 tablet 0     cholecalciferol, vitamin D3, (VITAMIN D3) 2,000 unit cap Take by " mouth.       sertraline (ZOLOFT) 50 MG tablet TAKE 1 TABLET BY MOUTH DAILY 90 tablet 2     UNABLE TO FIND 2 capsules daily. Med Name: (CuraPro) Powerful Antioxidant        No current facility-administered medications for this visit.      Family History   Problem Relation Age of Onset     Hypertension Father      Colon cancer Paternal Grandmother 80     Social History     Socioeconomic History     Marital status: Single     Spouse name: None     Number of children: None     Years of education: None     Highest education level: None   Occupational History     None   Social Needs     Financial resource strain: None     Food insecurity:     Worry: None     Inability: None     Transportation needs:     Medical: None     Non-medical: None   Tobacco Use     Smoking status: Never Smoker     Smokeless tobacco: Never Used   Substance and Sexual Activity     Alcohol use: Yes     Comment: once every 2 weeks     Drug use: No     Sexual activity: None   Lifestyle     Physical activity:     Days per week: None     Minutes per session: None     Stress: None   Relationships     Social connections:     Talks on phone: None     Gets together: None     Attends Confucianist service: None     Active member of club or organization: None     Attends meetings of clubs or organizations: None     Relationship status: None     Intimate partner violence:     Fear of current or ex partner: None     Emotionally abused: None     Physically abused: None     Forced sexual activity: None   Other Topics Concern     None   Social History Narrative     None     Patient Active Problem List   Diagnosis     Herpes Simplex Type I     Genital Warts     Common Migraine (Without Aura)     Allergic Rhinitis     Anxiety Disorder NOS     Irritable Bowel Syndrome     Hypothyroidism due to Hashimoto's thyroiditis     Vitamin D deficiency     Elevated Saccharomyces cerevisiae antibody level IgA  2017                                              SOCIAL: She  reports  that  has never smoked. she has never used smokeless tobacco. She reports that she drinks alcohol. She reports that she does not use drugs.    REVIEW OF SYSTEMS:   Family history not pertinent to chief complaint or presenting problem    Review of Systems:      Nervous System:  No headache, paresthesia or dizziness or fainting                                  Ears: No hearing loss or ringing in the ears    Eyes: No blurring of vision, Double Vision            No redness, itching or dryness.    Nose: No nosebleed or loss of smell    Mouth: No mouth sores or  coated tongue    Throat: No hoarseness or difficulty swallowing    Neck: History of enlarged thyroid or lymph nodes.    Heart: No chest pain, palpitation or irregular heartbeat.                  Lungs: No shortness of breath, wheezing or hemoptysis.    Gastrointestinal: No nausea or vomiting, melena or blood in stools.    Kidney/Bladdr: No polyuria, polydipsia, or hematuria.                             Genital/Sexual: No Sex function Changes                                Skin: No rash    Muscles/Joints/Bones: No Muscle morning stiffness, No Effusion of a Joint     Review of systems otherwise negative as requested from patient, except   Those positive ROS outlined and discussed in [place].    OBJECTIVE:  /74 (Patient Site: Right Arm, Patient Position: Sitting, Cuff Size: Adult Regular)   Pulse (!) 58   Wt 132 lb (59.9 kg)   SpO2 100%   Breastfeeding? No   BMI 24.14 kg/m      GENERAL:     No acute distress.   Alert and oriented X 3         Physical:    Full range of affect  Thyroid pump nontender without nodules  Right sacroiliac joint minimally tender  Left sacroiliac joint nontender  Small mobile 1.5 cm subcutaneous mass consistent with a back miles or lipoma        ASSESSMENT & PLAN      Tiffanie was seen today for thyroid problem.    Diagnoses and all orders for this visit:    Anxiety Disorder NOS    Hypothyroidism due to Hashimoto's thyroiditis  -      T3, Total  -     Thyroid Peroxidase Antibody  -     Thyroid [place]  -     US Thyroid; Future    Encounter for other screening for malignant neoplasm of breast  -     Mammo Screening Bilateral; Future        Return in about 6 months (around 8/25/2019) for if your symptoms worsen despite plan of care.       Anticipatory Guidance and Symptomatic Cares Discussed   Advised to call back directly if there are further questions, or if these symptoms fail to improve as anticipated or worsen.  Return to clinic if patient has a clinical concern that warrants an exam.         I spent 25 minutes with this patient face to face, of which 50% or greater was spent in counseling and coordination of care with regards to Tiffanie was seen today for thyroid problem.    Diagnoses and all orders for this visit:    Anxiety Disorder NOS    Hypothyroidism due to Hashimoto's thyroiditis  -     T3, Total  -     Thyroid Peroxidase Antibody  -     Thyroid [place]  -     US Thyroid; Future    Encounter for other screening for malignant neoplasm of breast  -     Mammo Screening Bilateral; Future        Valdo Pederson MD  Family Medicine   [facility] [address]  [phone]

## 2021-06-25 ENCOUNTER — OFFICE VISIT - HEALTHEAST (OUTPATIENT)
Dept: FAMILY MEDICINE | Facility: CLINIC | Age: 46
End: 2021-06-25

## 2021-06-25 DIAGNOSIS — Z20.822 EXPOSURE TO COVID-19 VIRUS: ICD-10-CM

## 2021-06-25 DIAGNOSIS — N93.8 DUB (DYSFUNCTIONAL UTERINE BLEEDING): ICD-10-CM

## 2021-06-25 DIAGNOSIS — E06.3 HYPOTHYROIDISM DUE TO HASHIMOTO'S THYROIDITIS: ICD-10-CM

## 2021-06-25 LAB — TSH SERPL DL<=0.005 MIU/L-ACNC: 5.19 UIU/ML (ref 0.3–5)

## 2021-06-25 RX ORDER — LEVOTHYROXINE SODIUM 75 UG/1
75 TABLET ORAL DAILY
Qty: 90 TABLET | Refills: 3 | Status: SHIPPED | OUTPATIENT
Start: 2021-06-25

## 2021-06-29 ENCOUNTER — COMMUNICATION - HEALTHEAST (OUTPATIENT)
Dept: SCHEDULING | Facility: CLINIC | Age: 46
End: 2021-06-29

## 2021-07-03 NOTE — ADDENDUM NOTE
Addendum Note by Javier Beal MD at 9/5/2018  9:41 AM     Author: Javier Beal MD Service: -- Author Type: Physician    Filed: 9/5/2018  9:41 AM Encounter Date: 9/5/2018 Status: Signed    : Javier Beal MD (Physician)    Addended by: JAVIER BEAL on: 9/5/2018 09:41 AM        Modules accepted: Orders

## 2021-07-05 PROBLEM — N93.8 DUB (DYSFUNCTIONAL UTERINE BLEEDING): Status: ACTIVE | Noted: 2021-06-25

## 2021-07-06 VITALS
OXYGEN SATURATION: 98 % | HEART RATE: 70 BPM | WEIGHT: 138.7 LBS | DIASTOLIC BLOOD PRESSURE: 72 MMHG | SYSTOLIC BLOOD PRESSURE: 118 MMHG | TEMPERATURE: 98.5 F | BODY MASS INDEX: 24.57 KG/M2

## 2021-07-07 NOTE — PROGRESS NOTES
ASSESSMENT & PLAN    DUB (dysfunctional uterine bleeding)  Last 5 cycles  [place] cycle  21 days 6/2021  17 days   5/2021   27 days  4/2021   14 days  4/2021   21 days  27, 27 ,27    Heavy?  No   In between OK     [place] 30 mg Daily           Tiffanie was seen today for medication management and labs only.    Diagnoses and all orders for this visit:    DUB (dysfunctional uterine bleeding)    Exposure to COVID-19 virus  -     COVID-19 Luis RBD Yelitza and Reflex Titer    Hypothyroidism due to Hashimoto's thyroiditis  -     Thyroid Stimulating Hormone (TSH); Standing  -     T4, Free; Standing  -     Thyroid Stimulating Hormone (TSH)  -     levothyroxine (SYNTHROID, LEVOTHROID) 75 MCG tablet; Take 1 tablet (75 mcg total) by mouth daily. New med discontinue the [place]   DanL 6/25/2021        Patient Instructions   Great to see you Tiffanie    We will plan on transitioning you to levothyroxine 75 mcg daily  You can stop the [place] Thyroid    Goal TSH 1.0    Conversion of T4-T3 relies on  Vitamin D  Vitamin D  Iodine  Copper/zinc  Selenium  Is a supplement 200 mcg of selenium daily is the goal    Recheck in 6 to 8 weeks    I am also checking your COVID-19 antibody response today    DanL        Return in about 3 months (around 9/25/2021).            CHIEF COMPLAINT: Tiffanie Ye had concerns including Medication Management and Labs Only (thyroid labs  ).    [place]: 1.............. SUBJECTIVE:  Tiffanie Ye is a 45 y.o. female had concerns including Medication Management and Labs Only (thyroid labs  ).    1. DUB (dysfunctional uterine bleeding)    2. Exposure to COVID-19 virus    3. Hypothyroidism due to Hashimoto's thyroiditis          Allergies   Allergen Reactions     Penicillins      Tolmetin      Other reaction(s): Other, see comments  PN: has stomach issues, contraindicated                         SOCIAL: She  reports that she has never smoked. She has never used smokeless tobacco. She reports current alcohol use. She  reports that she does not use drugs.    REVIEW OF SYSTEMS:   Family history not pertinent to chief complaint or presenting problem    Review of systems otherwise negative as requested from patient, except   Those positive ROS outlined and discussed in [place].      VITALS:  Vitals:    06/25/21 1318   BP: 118/72   Pulse: 70   Temp: 98.5  F (36.9  C)   TempSrc: Tympanic   SpO2: 98%   Weight: 138 lb 11.2 oz (62.9 kg)     Wt Readings from Last 3 Encounters:   06/25/21 138 lb 11.2 oz (62.9 kg)   12/22/20 145 lb 5 oz (65.9 kg)   01/03/20 133 lb 12 oz (60.7 kg)     Body mass index is 24.57 kg/m .    Physical Exam:  Full range of affect  Nonpressured speech  No tremor  Well-groomed         I spent 20  minutes with this patient.  This includes pre-visit, intra-visit and post visit work an evaluation with regards to Tiffanie was seen today for medication management and labs only.    Diagnoses and all orders for this visit:    DUB (dysfunctional uterine bleeding)    Exposure to COVID-19 virus  -     COVID-19 Luis RBD Yelitza and Reflex Titer    Hypothyroidism due to Hashimoto's thyroiditis  -     Thyroid Stimulating Hormone (TSH); Standing  -     T4, Free; Standing  -     Thyroid Stimulating Hormone (TSH)  -     levothyroxine (SYNTHROID, LEVOTHROID) 75 MCG tablet; Take 1 tablet (75 mcg total) by mouth daily. New med discontinue the [place]   DanL 6/25/2021        Valdo Pederson MD  Family Medicine   [facility] [address]  [phone]

## 2021-07-07 NOTE — PATIENT INSTRUCTIONS - HE
Great to see you Tiffanie    We will plan on transitioning you to levothyroxine 75 mcg daily  You can stop the [facility] Thyroid    Goal TSH 1.0    Conversion of T4-T3 relies on  Vitamin D  Vitamin D  Iodine  Copper/zinc  Selenium  Is a supplement 200 mcg of selenium daily is the goal    Recheck in 6 to 8 weeks    I am also checking your COVID-19 antibody response today    Luiza

## 2021-07-09 ENCOUNTER — COMMUNICATION - HEALTHEAST (OUTPATIENT)
Dept: FAMILY MEDICINE | Facility: CLINIC | Age: 46
End: 2021-07-09

## 2021-07-09 DIAGNOSIS — G43.101 MIGRAINE WITH AURA AND WITH STATUS MIGRAINOSUS, NOT INTRACTABLE: ICD-10-CM

## 2021-07-11 PROBLEM — Z78.9 HAS IMMUNITY TO COVID-19 VIRUS: Status: ACTIVE | Noted: 2021-07-07

## 2021-07-15 DIAGNOSIS — G43.909 MIGRAINE: Primary | ICD-10-CM

## 2021-07-15 DIAGNOSIS — G43.009 MIGRAINE WITHOUT AURA AND WITHOUT STATUS MIGRAINOSUS, NOT INTRACTABLE: ICD-10-CM

## 2021-07-15 DIAGNOSIS — G43.009 MIGRAINE WITHOUT AURA AND WITHOUT STATUS MIGRAINOSUS, NOT INTRACTABLE: Primary | ICD-10-CM

## 2021-07-15 NOTE — TELEPHONE ENCOUNTER
An error occurred while processing the e-prescribing message on the following medication.     The Rx was not sent electronically to the requested pharmacy. Will route to PCP to address.     Medication: SUMAtriptan (IMITREX) 50 MG tablet  [facility]   Last OV: 06/25/21      GF/CRYSTAL

## 2021-07-16 RX ORDER — SUMATRIPTAN 50 MG/1
TABLET, FILM COATED ORAL
Qty: 18 TABLET | Refills: 3 | Status: SHIPPED | OUTPATIENT
Start: 2021-07-16 | End: 2021-11-08

## 2021-07-20 DIAGNOSIS — G43.009 MIGRAINE WITHOUT AURA AND WITHOUT STATUS MIGRAINOSUS, NOT INTRACTABLE: ICD-10-CM

## 2021-07-20 RX ORDER — SUMATRIPTAN 50 MG/1
50 TABLET, FILM COATED ORAL
Qty: 18 TABLET | Refills: 3 | Status: SHIPPED | OUTPATIENT
Start: 2021-07-20 | End: 2021-07-20

## 2021-07-20 RX ORDER — SUMATRIPTAN 50 MG/1
50 TABLET, FILM COATED ORAL
Qty: 18 TABLET | Refills: 3 | Status: SHIPPED | OUTPATIENT
Start: 2021-07-20 | End: 2021-11-08

## 2021-08-29 ENCOUNTER — HEALTH MAINTENANCE LETTER (OUTPATIENT)
Age: 46
End: 2021-08-29

## 2021-09-13 DIAGNOSIS — Z20.822 EXPOSURE TO COVID-19 VIRUS: Primary | ICD-10-CM

## 2021-09-14 ENCOUNTER — LAB (OUTPATIENT)
Dept: LAB | Facility: CLINIC | Age: 46
End: 2021-09-14
Attending: FAMILY MEDICINE
Payer: COMMERCIAL

## 2021-09-14 DIAGNOSIS — Z20.822 EXPOSURE TO COVID-19 VIRUS: ICD-10-CM

## 2021-09-14 DIAGNOSIS — E06.3 HYPOTHYROIDISM DUE TO HASHIMOTO'S THYROIDITIS: ICD-10-CM

## 2021-09-14 PROCEDURE — 36415 COLL VENOUS BLD VENIPUNCTURE: CPT

## 2021-09-14 PROCEDURE — 84439 ASSAY OF FREE THYROXINE: CPT

## 2021-09-14 PROCEDURE — U0005 INFEC AGEN DETEC AMPLI PROBE: HCPCS

## 2021-09-14 PROCEDURE — 84443 ASSAY THYROID STIM HORMONE: CPT

## 2021-09-14 PROCEDURE — U0003 INFECTIOUS AGENT DETECTION BY NUCLEIC ACID (DNA OR RNA); SEVERE ACUTE RESPIRATORY SYNDROME CORONAVIRUS 2 (SARS-COV-2) (CORONAVIRUS DISEASE [COVID-19]), AMPLIFIED PROBE TECHNIQUE, MAKING USE OF HIGH THROUGHPUT TECHNOLOGIES AS DESCRIBED BY CMS-2020-01-R: HCPCS

## 2021-09-15 LAB
SARS-COV-2 RNA RESP QL NAA+PROBE: NEGATIVE
T4 FREE SERPL-MCNC: 0.86 NG/DL (ref 0.76–1.46)
TSH SERPL DL<=0.005 MIU/L-ACNC: 2.93 MU/L (ref 0.4–4)

## 2021-10-11 ENCOUNTER — MYC REFILL (OUTPATIENT)
Dept: FAMILY MEDICINE | Facility: CLINIC | Age: 46
End: 2021-10-11

## 2021-10-11 DIAGNOSIS — F41.1 ANXIETY STATE: ICD-10-CM

## 2021-10-12 DIAGNOSIS — F41.1 ANXIETY STATE: ICD-10-CM

## 2021-10-12 RX ORDER — SERTRALINE HYDROCHLORIDE 100 MG/1
100 TABLET, FILM COATED ORAL DAILY
Qty: 90 TABLET | Refills: 3 | Status: CANCELLED | OUTPATIENT
Start: 2021-10-12

## 2021-10-12 RX ORDER — SERTRALINE HYDROCHLORIDE 100 MG/1
100 TABLET, FILM COATED ORAL DAILY
Qty: 90 TABLET | Refills: 3 | Status: SHIPPED | OUTPATIENT
Start: 2021-10-12 | End: 2022-11-06

## 2021-10-12 RX ORDER — SERTRALINE HYDROCHLORIDE 100 MG/1
100 TABLET, FILM COATED ORAL DAILY
Qty: 90 TABLET | Refills: 1 | Status: CANCELLED
Start: 2021-10-12

## 2021-10-12 NOTE — TELEPHONE ENCOUNTER
Pending Prescriptions:                       Disp   Refills    sertraline (ZOLOFT) 100 MG tablet         90 tab*1            Sig: Take 1 tablet (100 mg) by mouth daily    Last office visit with Dr. Pederson was 6/25/21.  Next appointment is November 8, 2021 with Dr. Pederson.

## 2021-10-12 NOTE — TELEPHONE ENCOUNTER
Patient out of medication for 4 days.    Pending Prescriptions:                       Disp   Refills    sertraline (ZOLOFT) 100 MG tablet         90 tab*1            Sig: Take 1 tablet (100 mg) by mouth daily

## 2021-10-12 NOTE — TELEPHONE ENCOUNTER
Pt is on day 4 of being without her meds and staring to not feeling like herself.  Pharm had been trying to reach us without any response.    Please refill med.

## 2021-10-24 ENCOUNTER — HEALTH MAINTENANCE LETTER (OUTPATIENT)
Age: 46
End: 2021-10-24

## 2021-11-08 ENCOUNTER — OFFICE VISIT (OUTPATIENT)
Dept: FAMILY MEDICINE | Facility: CLINIC | Age: 46
End: 2021-11-08
Payer: COMMERCIAL

## 2021-11-08 VITALS
BODY MASS INDEX: 26.04 KG/M2 | WEIGHT: 141.5 LBS | DIASTOLIC BLOOD PRESSURE: 76 MMHG | SYSTOLIC BLOOD PRESSURE: 132 MMHG | HEIGHT: 62 IN | HEART RATE: 60 BPM | OXYGEN SATURATION: 100 %

## 2021-11-08 DIAGNOSIS — F43.10 PTSD (POST-TRAUMATIC STRESS DISORDER): ICD-10-CM

## 2021-11-08 DIAGNOSIS — G43.009 MIGRAINE WITHOUT AURA AND WITHOUT STATUS MIGRAINOSUS, NOT INTRACTABLE: ICD-10-CM

## 2021-11-08 DIAGNOSIS — Z13.220 SCREENING FOR HYPERLIPIDEMIA: Primary | ICD-10-CM

## 2021-11-08 DIAGNOSIS — D48.5 NEOPLASM OF UNCERTAIN BEHAVIOR OF SKIN: ICD-10-CM

## 2021-11-08 DIAGNOSIS — Z80.0 FAMILY HISTORY OF COLON CANCER: ICD-10-CM

## 2021-11-08 DIAGNOSIS — Z12.11 SCREENING FOR COLORECTAL CANCER: ICD-10-CM

## 2021-11-08 DIAGNOSIS — Z12.12 SCREENING FOR COLORECTAL CANCER: ICD-10-CM

## 2021-11-08 PROCEDURE — 99396 PREV VISIT EST AGE 40-64: CPT | Performed by: FAMILY MEDICINE

## 2021-11-08 ASSESSMENT — MIFFLIN-ST. JEOR: SCORE: 1240.09

## 2021-11-08 NOTE — PROGRESS NOTES
ASSESSMENT & PLAN    No problem-specific Assessment & Plan notes found for this encounter.      Tiffanie was seen today for physical.    Diagnoses and all orders for this visit:    Screening for hyperlipidemia    Migraine without aura and without status migrainosus, not intractable    Screening for colorectal cancer  -     Adult Gastro Ref - Procedure Only; Future    Family history of colon cancer  -     Adult Gastro Ref - Procedure Only; Future    Neoplasm of uncertain behavior of skin  -     Adult Dermatology Referral; Future    PTSD (post-traumatic stress disorder)    Other orders  -     REVIEW OF HEALTH MAINTENANCE PROTOCOL ORDERS        There are no Patient Instructions on file for this visit.    No follow-ups on file.       [unfilled]    CHIEF COMPLAINT: Tiffanie Ye had concerns including Physical.    [place]: 1.............. SUBJECTIVE:  Tiffanie Ye is a 46 year old female had concerns including Physical.    1. Screening for hyperlipidemia    2. Migraine without aura and without status migrainosus, not intractable    3. Screening for colorectal cancer    4. Family history of colon cancer    5. Neoplasm of uncertain behavior of skin    6. PTSD (post-traumatic stress disorder)        History of hypothyroidism  Hashimoto's thyroiditis    History of migraines currently quiesced    A little bit of numbness in her toes left as well as fingertips bilaterally    Urinary incontinence sounds like stress incontinence mild    Medical cannabis helps with sleeping    She is due for a Pap smear      PTSD history      Allergies   Allergen Reactions     Penicillins Unknown     Tolmetin Unknown     Other reaction(s): Other, see comments, PN: has stomach issues, contraindicated                         SOCIAL: She  reports that she has never smoked. She has never used smokeless tobacco. She reports current alcohol use. She reports that she does not use drugs.    REVIEW OF SYSTEMS:   Family history not pertinent to chief complaint  "or presenting problem    Review of systems otherwise negative as requested from patient, except   Those positive ROS outlined and discussed in [place].      VITALS:  Vitals:    11/08/21 1253   BP: 132/76   BP Location: Left arm   Patient Position: Sitting   Cuff Size: Adult Regular   Pulse: 60   SpO2: 100%   Weight: 64.2 kg (141 lb 8 oz)   Height: 1.575 m (5' 2\")     Wt Readings from Last 3 Encounters:   11/08/21 64.2 kg (141 lb 8 oz)   06/25/21 62.9 kg (138 lb 11.2 oz)   12/22/20 65.9 kg (145 lb 5 oz)     Body mass index is 25.88 kg/m .    Physical Exam:      Physical:  General Appearance: Healthy-appearingy.   Head:  No deformity  Eyes: Sclerae white, pupils equal and reactive, extra ocular movements intact   Ears: Well-positioned, well-formed pinnae; TM pearly white, translucent, no bulging   Nose: Clear, normal mucosa no drainage or crusting  Throat: Lips, tongue, and mucosa are moist, pink and intact; tongue no thrush oral pharynx no injection or lesions  Neck: Supple, symmetric ROM no nodes   No carotid Buits  Chest: Lungs clear to auscultation, no retractions  Heart: Regular rate & rhythm, S1 S2, no murmur  Abdomen: Soft, non-tender, no masses; umbilical area normal   Pulses: Equal femoral pulses  : No hernia palpable   Extremities: Well-perfused, warm and dry, No Edema  Palpable Pulses Bilateral  Neuro: Easily aroused good tone NO tremor   Skin  No Rash mole back by pigmented 4 mm x 3 mm medial to the scapula on the right, solar keratosis right cheek 1 cm           I spent 30 minutes with this patient.  This includes pre-visit, intra-visit and post visit work an evaluation with regards to Tiffanie was seen today for physical.    Diagnoses and all orders for this visit:    Screening for hyperlipidemia    Migraine without aura and without status migrainosus, not intractable    Screening for colorectal cancer  -     Adult Gastro Ref - Procedure Only; Future    Family history of colon cancer  -     Adult Gastro " Ref - Procedure Only; Future    Neoplasm of uncertain behavior of skin  -     Adult Dermatology Referral; Future    PTSD (post-traumatic stress disorder)    Other orders  -     REVIEW OF HEALTH MAINTENANCE PROTOCOL ORDERS        Valdo Pederson MD  [facility] [id number] [phone]    Answers for HPI/ROS submitted by the patient on 11/8/2021  Frequency of exercise:: None  Getting at least 3 servings of Calcium per day:: Yes  Diet:: Gluten-free/reduced, Other  Taking medications regularly:: Yes  Medication side effects:: Not applicable  Bi-annual eye exam:: NO  Dental care twice a year:: NO  Sleep apnea or symptoms of sleep apnea:: None  Additional concerns today:: No

## 2021-11-08 NOTE — PATIENT INSTRUCTIONS
So nice to see you as always Tiffanie    Lets get you into see a dermatologist  Get a full skin survey    Lets have you repeat and follow-up on a colonoscopy given your family history and ensure that you have a completely normal colonoscopy    I will recertify you for medical cannabis    Goal TSH for you 1.0  Continue the 75 mcg at the present time    I put an order in for a Covid booster      Keep active  Get back into yoga  Try to exercise more days than not    I would encourage you to consider trying  Inulin I like [employer] brand  1 tablespoon daily    This is an excellent prebiotic      Checkout the product called Flyte for urinary incontinence    Accomplish your Pap smear, of course I would do this but find a person you are comfortable with performing this exam        Luiza

## 2021-12-13 ENCOUNTER — IMMUNIZATION (OUTPATIENT)
Dept: NURSING | Facility: CLINIC | Age: 46
End: 2021-12-13
Payer: COMMERCIAL

## 2021-12-13 PROCEDURE — 91306 COVID-19,PF,MODERNA (18+ YRS BOOSTER .25ML): CPT

## 2021-12-13 PROCEDURE — 0064A COVID-19,PF,MODERNA (18+ YRS BOOSTER .25ML): CPT

## 2021-12-21 ENCOUNTER — TRANSFERRED RECORDS (OUTPATIENT)
Dept: HEALTH INFORMATION MANAGEMENT | Facility: CLINIC | Age: 46
End: 2021-12-21
Payer: COMMERCIAL

## 2022-05-16 ENCOUNTER — LAB (OUTPATIENT)
Dept: LAB | Facility: CLINIC | Age: 47
End: 2022-05-16
Payer: COMMERCIAL

## 2022-05-16 DIAGNOSIS — E06.3 HYPOTHYROIDISM DUE TO HASHIMOTO'S THYROIDITIS: ICD-10-CM

## 2022-05-16 LAB
T4 FREE SERPL-MCNC: 0.97 NG/DL (ref 0.76–1.46)
TSH SERPL DL<=0.005 MIU/L-ACNC: 1.9 MU/L (ref 0.4–4)

## 2022-05-16 PROCEDURE — 84439 ASSAY OF FREE THYROXINE: CPT

## 2022-05-16 PROCEDURE — 36415 COLL VENOUS BLD VENIPUNCTURE: CPT

## 2022-05-16 PROCEDURE — 84443 ASSAY THYROID STIM HORMONE: CPT

## 2022-05-17 ENCOUNTER — HOSPITAL ENCOUNTER (OUTPATIENT)
Facility: AMBULATORY SURGERY CENTER | Age: 47
End: 2022-05-17
Attending: SURGERY
Payer: COMMERCIAL

## 2022-06-10 ENCOUNTER — TELEPHONE (OUTPATIENT)
Dept: NURSING | Facility: CLINIC | Age: 47
End: 2022-06-10
Payer: COMMERCIAL

## 2022-06-10 RX ORDER — LIDOCAINE 40 MG/G
CREAM TOPICAL
Status: CANCELLED | OUTPATIENT
Start: 2022-06-10

## 2022-06-10 RX ORDER — ONDANSETRON 2 MG/ML
4 INJECTION INTRAMUSCULAR; INTRAVENOUS
Status: CANCELLED | OUTPATIENT
Start: 2022-06-10

## 2022-06-10 NOTE — TELEPHONE ENCOUNTER
Outreach to patient to discuss COVID testing for upcoming procedure.     Spoke with: patient  At this time if you are having a same day procedure you can complete an at home COVID test up to 4 days before your procedure and take a picture of the results to bring with you day of procedure.    Randee Victoria LPN

## 2022-06-13 ENCOUNTER — OFFICE VISIT (OUTPATIENT)
Dept: URGENT CARE | Facility: URGENT CARE | Age: 47
End: 2022-06-13

## 2022-06-13 ENCOUNTER — NURSE TRIAGE (OUTPATIENT)
Dept: NURSING | Facility: CLINIC | Age: 47
End: 2022-06-13
Payer: COMMERCIAL

## 2022-06-13 ENCOUNTER — ANCILLARY PROCEDURE (OUTPATIENT)
Dept: GENERAL RADIOLOGY | Facility: CLINIC | Age: 47
End: 2022-06-13
Attending: NURSE PRACTITIONER

## 2022-06-13 VITALS
BODY MASS INDEX: 24.71 KG/M2 | SYSTOLIC BLOOD PRESSURE: 131 MMHG | TEMPERATURE: 98.8 F | OXYGEN SATURATION: 99 % | DIASTOLIC BLOOD PRESSURE: 69 MMHG | HEART RATE: 60 BPM | WEIGHT: 135.13 LBS | RESPIRATION RATE: 16 BRPM

## 2022-06-13 DIAGNOSIS — S19.9XXA INJURY OF NECK, INITIAL ENCOUNTER: Primary | ICD-10-CM

## 2022-06-13 DIAGNOSIS — S19.9XXA INJURY OF NECK, INITIAL ENCOUNTER: ICD-10-CM
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
## 2022-06-13 PROCEDURE — 72040 X-RAY EXAM NECK SPINE 2-3 VW: CPT | Mod: TC | Performed by: RADIOLOGY

## 2022-06-13 PROCEDURE — 99213 OFFICE O/P EST LOW 20 MIN: CPT | Performed by: NURSE PRACTITIONER

## 2022-06-13 RX ORDER — MEPERIDINE HYDROCHLORIDE 25 MG/ML
12.5 INJECTION INTRAMUSCULAR; INTRAVENOUS; SUBCUTANEOUS
Status: CANCELLED | OUTPATIENT
Start: 2022-06-13

## 2022-06-13 RX ORDER — SODIUM CHLORIDE, SODIUM LACTATE, POTASSIUM CHLORIDE, CALCIUM CHLORIDE 600; 310; 30; 20 MG/100ML; MG/100ML; MG/100ML; MG/100ML
INJECTION, SOLUTION INTRAVENOUS CONTINUOUS
Status: CANCELLED | OUTPATIENT
Start: 2022-06-13

## 2022-06-13 RX ORDER — ONDANSETRON 4 MG/1
4 TABLET, ORALLY DISINTEGRATING ORAL EVERY 30 MIN PRN
Status: CANCELLED | OUTPATIENT
Start: 2022-06-13

## 2022-06-13 RX ORDER — LIDOCAINE 40 MG/G
CREAM TOPICAL
Status: CANCELLED | OUTPATIENT
Start: 2022-06-13

## 2022-06-13 RX ORDER — ONDANSETRON 2 MG/ML
4 INJECTION INTRAMUSCULAR; INTRAVENOUS EVERY 30 MIN PRN
Status: CANCELLED | OUTPATIENT
Start: 2022-06-13

## 2022-06-13 RX ORDER — FENTANYL CITRATE 0.05 MG/ML
25 INJECTION, SOLUTION INTRAMUSCULAR; INTRAVENOUS
Status: CANCELLED | OUTPATIENT
Start: 2022-06-13

## 2022-06-13 RX ORDER — FENTANYL CITRATE 0.05 MG/ML
25 INJECTION, SOLUTION INTRAMUSCULAR; INTRAVENOUS EVERY 5 MIN PRN
Status: CANCELLED | OUTPATIENT
Start: 2022-06-13

## 2022-06-13 RX ORDER — FENTANYL CITRATE 0.05 MG/ML
50 INJECTION, SOLUTION INTRAMUSCULAR; INTRAVENOUS
Status: CANCELLED | OUTPATIENT
Start: 2022-06-13

## 2022-06-13 RX ORDER — OXYCODONE HYDROCHLORIDE 5 MG/1
5 TABLET ORAL EVERY 4 HOURS PRN
Status: CANCELLED | OUTPATIENT
Start: 2022-06-13

## 2022-06-13 RX ORDER — HYDROMORPHONE HYDROCHLORIDE 1 MG/ML
0.5 INJECTION, SOLUTION INTRAMUSCULAR; INTRAVENOUS; SUBCUTANEOUS EVERY 5 MIN PRN
Status: CANCELLED | OUTPATIENT
Start: 2022-06-13
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
## 2022-06-27 ENCOUNTER — OFFICE VISIT (OUTPATIENT)
Dept: FAMILY MEDICINE | Facility: CLINIC | Age: 47
End: 2022-06-27
Payer: COMMERCIAL

## 2022-06-27 VITALS
SYSTOLIC BLOOD PRESSURE: 134 MMHG | DIASTOLIC BLOOD PRESSURE: 72 MMHG | OXYGEN SATURATION: 98 % | BODY MASS INDEX: 24.93 KG/M2 | WEIGHT: 136.3 LBS | HEART RATE: 56 BPM

## 2022-06-27 DIAGNOSIS — Z12.4 CERVICAL CANCER SCREENING: ICD-10-CM

## 2022-06-27 DIAGNOSIS — D25.2 INTRAMURAL, SUBMUCOUS, AND SUBSEROUS LEIOMYOMA OF UTERUS: ICD-10-CM

## 2022-06-27 DIAGNOSIS — D25.0 INTRAMURAL, SUBMUCOUS, AND SUBSEROUS LEIOMYOMA OF UTERUS: ICD-10-CM

## 2022-06-27 DIAGNOSIS — N94.6 DYSMENORRHEA: Primary | ICD-10-CM

## 2022-06-27 DIAGNOSIS — Z12.31 VISIT FOR SCREENING MAMMOGRAM: ICD-10-CM

## 2022-06-27 DIAGNOSIS — D25.1 INTRAMURAL, SUBMUCOUS, AND SUBSEROUS LEIOMYOMA OF UTERUS: ICD-10-CM

## 2022-06-27 DIAGNOSIS — K59.1 FUNCTIONAL DIARRHEA: ICD-10-CM

## 2022-06-27 DIAGNOSIS — Z13.220 SCREENING FOR HYPERLIPIDEMIA: ICD-10-CM

## 2022-06-27 PROCEDURE — 99214 OFFICE O/P EST MOD 30 MIN: CPT | Performed by: FAMILY MEDICINE
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
## 2022-06-27 NOTE — PATIENT INSTRUCTIONS
Thanks for coming in Tiffanie Salazar go ahead and accomplish your colonoscopy    Continue to avoid known triggers for abdominal discomfort    Good thought about oral contraceptives and the way it interacts with your bowels  I think we should go ahead and reinstitute therapy for 3 months and see how your body responds to this    Follow through with OB/GYN on the ovarian cysts  Typically fibroid is benign it is noted in the chart    Goal TSH for you 1.0  Keep off of the other supplements with the exception of vitamin D    Recheck your thyroid function test in 6 weeks    By definition you have irritable bowel syndrome, which I think we all have  Let's keep working to find the triggers    I know this sounds very simple but you could just try adding simple Culturelle which has worked for some people      Luiza

## 2022-06-27 NOTE — TELEPHONE ENCOUNTER
Attempted to contact patient at the request of Dr Pederson to find out if she would be ok with him prescribing Loesterin birth control. We tried to contact her pharmacy to see if they had records of what she was previously prescribed and they do not have anything on file.    Left message to call clinic.    If patient calls back, find out if she would be alright with Dr Pederson ordering Loesterin for her birth control.

## 2022-06-27 NOTE — ASSESSMENT & PLAN NOTE
Diarrhea   1-5 x  No blood  +bloating  (better with low dairy)     Weight >> Thyroid + Fewer calories     Pain with BM     Inside Pain >> Like I'm going to throw   As I am going   Camping >> Nauseated >> threw Up     Colitis  Mom

## 2022-07-01 RX ORDER — NORETHINDRONE ACETATE AND ETHINYL ESTRADIOL 1MG-20(21)
1 KIT ORAL DAILY
Qty: 84 TABLET | Refills: 3 | Status: SHIPPED | OUTPATIENT
Start: 2022-07-01

## 2022-10-15 ENCOUNTER — HEALTH MAINTENANCE LETTER (OUTPATIENT)
Age: 47
End: 2022-10-15

## 2022-11-03 DIAGNOSIS — F41.1 ANXIETY STATE: ICD-10-CM
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
## 2022-11-06 RX ORDER — SERTRALINE HYDROCHLORIDE 100 MG/1
100 TABLET, FILM COATED ORAL DAILY
Qty: 90 TABLET | Refills: 0 | Status: SHIPPED | OUTPATIENT
Start: 2022-11-06

## 2023-01-13 ENCOUNTER — TRANSCRIBE ORDERS (OUTPATIENT)
Dept: OTHER | Age: 48
End: 2023-01-13

## 2023-01-13 DIAGNOSIS — Z12.11 SCREEN FOR COLON CANCER: Primary | ICD-10-CM

## 2023-03-06 ENCOUNTER — ANCILLARY PROCEDURE (OUTPATIENT)
Dept: GENERAL RADIOLOGY | Facility: CLINIC | Age: 48
End: 2023-03-06
Attending: NURSE PRACTITIONER
Payer: COMMERCIAL

## 2023-03-06 ENCOUNTER — OFFICE VISIT (OUTPATIENT)
Dept: URGENT CARE | Facility: URGENT CARE | Age: 48
End: 2023-03-06
Payer: COMMERCIAL

## 2023-03-06 VITALS
OXYGEN SATURATION: 99 % | DIASTOLIC BLOOD PRESSURE: 81 MMHG | HEART RATE: 63 BPM | TEMPERATURE: 97.7 F | SYSTOLIC BLOOD PRESSURE: 123 MMHG | BODY MASS INDEX: 26.63 KG/M2 | WEIGHT: 145.6 LBS

## 2023-03-06 DIAGNOSIS — M79.672 LEFT FOOT PAIN: ICD-10-CM
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
## 2023-03-06 DIAGNOSIS — S93.602A FOOT SPRAIN, LEFT, INITIAL ENCOUNTER: ICD-10-CM

## 2023-03-06 PROBLEM — G89.29 OTHER CHRONIC PAIN: Status: ACTIVE | Noted: 2018-05-28

## 2023-03-06 PROBLEM — F33.9 RECURRENT MAJOR DEPRESSIVE EPISODES (H): Status: ACTIVE | Noted: 2023-03-06

## 2023-03-06 PROBLEM — R87.619 ATYPICAL GLANDULAR CELLS ON CERVICAL PAP SMEAR: Status: ACTIVE | Noted: 2023-03-06

## 2023-03-06 PROBLEM — F32.A DEPRESSION: Status: ACTIVE | Noted: 2018-05-28

## 2023-03-06 PROBLEM — E06.3 HASHIMOTO'S DISEASE: Status: ACTIVE | Noted: 2018-05-28

## 2023-03-06 PROCEDURE — 99213 OFFICE O/P EST LOW 20 MIN: CPT | Performed by: NURSE PRACTITIONER

## 2023-03-06 PROCEDURE — 73630 X-RAY EXAM OF FOOT: CPT | Mod: TC | Performed by: RADIOLOGY

## 2023-03-06 NOTE — PROGRESS NOTES
"Assessment & Plan      Diagnosis Comments   1. Fall down stairs, initial encounter  XR Foot Left G/E 3 Views       2. Left foot pain  XR Foot Left G/E 3 Views       3. Foot sprain, left, initial encounter        Left foot xray negative for fracture. Pain likely due to foot sprain which may take months to fully heal. Continue pain control with ibuprofen and Tylenol as needed. Activity as tolerated with rest as needed. If the left foot pain or swelling worsens, follow up with primary care provider. Patient verbalized understanding of above plan.    vIy Ruano, Student Nurse Practitioner    Michelle Gibson, JUNE [facility]    Rudi Moreno is a 47 year old female who presents to clinic today for the following health issues:  Chief Complaint   Patient presents with     Foot Injury     Fell down a step a couple months ago, L foot swelled, bruised and cracked. Never got it looked at and still bothered. Wants xray. PS = 2/3      HPI    Patient was on vacation 2 months ago when she rolled her left ankle, heard or felt a \"crack,\" and experienced immediate swelling & pain. She had trouble fitting the foot into shoes at first due to swelling. The pain and swelling have improved, but patient is concerned that pain to the lateral forefoot area is lingering. It is noted intermittently when putting weight on the left foot with ambulation. She denies limitation in range of motion. No numbness or tingling.     Review of Systems  Constitutional, HEENT, cardiovascular, pulmonary, gi and gu systems are negative, except as otherwise noted.      Objective    /81 (BP Location: Right arm, Cuff Size: Adult Regular)   Pulse 63   Temp 97.7  F (36.5  C) (Tympanic)   Wt 66 kg (145 lb 9.6 oz)   SpO2 99%   BMI 26.63 kg/m    Physical Exam   GENERAL: healthy, alert and no distress  NECK: no asymmetry, masses, or scars  RESP: Breathing non-labored  CV: Left foot and ankle without edema or " erythema. Skin warm, capillary refill <2 seconds.  ABDOMEN: soft, nontender  MS: no gross musculoskeletal defects noted, no edema, left ankle normal range of motion    Results for orders placed or performed in visit on 03/06/23   XR Foot Left G/E 3 Views     Status: None    Narrative    XR FOOT LEFT G/E 3 VIEWS 3/6/2023 10:42 AM     HISTORY: Fall down stairs, initial encounter; Left foot pain    COMPARISON: None.         Impression    IMPRESSION: Accessory os navicularis. The left foot is negative for  fracture. Accessory ossicle adjacent to the cuboid. Plantar calcaneal  spurring.    MELISSA ARCEO MD         SYSTEM ID:  [id number]

## 2023-03-06 NOTE — RESULT ENCOUNTER NOTE
Results discussed with patient in clinic. States understanding of these results.    Michelle Gibson CNP

## 2023-08-20 ENCOUNTER — HEALTH MAINTENANCE LETTER (OUTPATIENT)
Age: 48
End: 2023-08-20

## 2023-09-06 ENCOUNTER — LAB REQUISITION (OUTPATIENT)
Dept: LAB | Facility: CLINIC | Age: 48
End: 2023-09-06

## 2023-09-06 DIAGNOSIS — N93.9 ABNORMAL UTERINE AND VAGINAL BLEEDING, UNSPECIFIED: ICD-10-CM

## 2023-09-06 DIAGNOSIS — Z11.3 ENCOUNTER FOR SCREENING FOR INFECTIONS WITH A PREDOMINANTLY SEXUAL MODE OF TRANSMISSION: ICD-10-CM

## 2023-09-06 LAB
T PALLIDUM AB SER QL: NONREACTIVE
T4 FREE SERPL-MCNC: 1.02 NG/DL (ref 0.9–1.7)
TSH SERPL DL<=0.005 MIU/L-ACNC: 4.87 UIU/ML (ref 0.3–4.2)

## 2023-09-06 PROCEDURE — 86803 HEPATITIS C AB TEST: CPT | Performed by: NURSE PRACTITIONER

## 2023-09-06 PROCEDURE — 86780 TREPONEMA PALLIDUM: CPT | Performed by: NURSE PRACTITIONER

## 2023-09-06 PROCEDURE — 87340 HEPATITIS B SURFACE AG IA: CPT | Performed by: NURSE PRACTITIONER

## 2023-09-06 PROCEDURE — 87389 HIV-1 AG W/HIV-1&-2 AB AG IA: CPT | Performed by: NURSE PRACTITIONER

## 2023-09-06 PROCEDURE — 84443 ASSAY THYROID STIM HORMONE: CPT | Performed by: NURSE PRACTITIONER

## 2023-09-06 PROCEDURE — 84439 ASSAY OF FREE THYROXINE: CPT | Performed by: NURSE PRACTITIONER

## 2023-09-07 LAB
HBV SURFACE AG SERPL QL IA: NONREACTIVE
HCV AB SERPL QL IA: NONREACTIVE
HIV 1+2 AB+HIV1 P24 AG SERPL QL IA: NONREACTIVE

## 2023-10-29 ENCOUNTER — HEALTH MAINTENANCE LETTER (OUTPATIENT)
Age: 48
End: 2023-10-29

## 2024-01-11 ENCOUNTER — LAB REQUISITION (OUTPATIENT)
Dept: LAB | Facility: CLINIC | Age: 49
End: 2024-01-11

## 2024-01-11 DIAGNOSIS — E06.3 AUTOIMMUNE THYROIDITIS: ICD-10-CM

## 2024-01-11 LAB
T4 SERPL-MCNC: 7.7 UG/DL (ref 4.5–11.7)
TSH SERPL DL<=0.005 MIU/L-ACNC: 4.61 UIU/ML (ref 0.3–4.2)

## 2024-01-11 PROCEDURE — 84436 ASSAY OF TOTAL THYROXINE: CPT | Performed by: FAMILY MEDICINE

## 2024-01-11 PROCEDURE — 84443 ASSAY THYROID STIM HORMONE: CPT | Performed by: FAMILY MEDICINE

## 2024-10-13 ENCOUNTER — HEALTH MAINTENANCE LETTER (OUTPATIENT)
Age: 49
End: 2024-10-13

## 2025-06-19 ENCOUNTER — HOSPITAL ENCOUNTER (EMERGENCY)
Facility: HOSPITAL | Age: 50
Discharge: HOME | End: 2025-06-19
Admitting: EMERGENCY MEDICINE

## 2025-06-19 VITALS
SYSTOLIC BLOOD PRESSURE: 150 MMHG | DIASTOLIC BLOOD PRESSURE: 86 MMHG | RESPIRATION RATE: 20 BRPM | OXYGEN SATURATION: 99 % | HEART RATE: 73 BPM | WEIGHT: 136.5 LBS | TEMPERATURE: 98.2 F | HEIGHT: 61 IN | BODY MASS INDEX: 25.77 KG/M2

## 2025-06-19 DIAGNOSIS — S62.639B OPEN FRACTURE OF TUFT OF DISTAL PHALANX OF FINGER: ICD-10-CM

## 2025-06-19 DIAGNOSIS — S61.219A FINGER LACERATION: ICD-10-CM

## 2025-06-19 PROCEDURE — 26725 TREAT FINGER FRACTURE EACH: CPT | Mod: F2

## 2025-06-19 PROCEDURE — 99284 EMERGENCY DEPT VISIT MOD MDM: CPT | Mod: 25

## 2025-06-19 PROCEDURE — 12002 RPR S/N/AX/GEN/TRNK2.6-7.5CM: CPT

## 2025-06-19 RX ORDER — CEPHALEXIN 500 MG/1
500 CAPSULE ORAL 4 TIMES DAILY
Qty: 20 CAPSULE | Refills: 0 | Status: SHIPPED | OUTPATIENT
Start: 2025-06-19 | End: 2025-06-24

## 2025-06-19 ASSESSMENT — COLUMBIA-SUICIDE SEVERITY RATING SCALE - C-SSRS
1. IN THE PAST MONTH, HAVE YOU WISHED YOU WERE DEAD OR WISHED YOU COULD GO TO SLEEP AND NOT WAKE UP?: NO
6. HAVE YOU EVER DONE ANYTHING, STARTED TO DO ANYTHING, OR PREPARED TO DO ANYTHING TO END YOUR LIFE?: NO
2. HAVE YOU ACTUALLY HAD ANY THOUGHTS OF KILLING YOURSELF IN THE PAST MONTH?: NO

## 2025-06-19 NOTE — DISCHARGE INSTRUCTIONS
Were seen and evaluated here in the emergency department after injuring your left middle finger while at work.  There is a small fracture which was seen at urgent care however, wound was repaired with 4 loose stitches and we will have you follow-up closely with [facility] for recheck either tomorrow or Monday.  Please take antibiotics as prescribed.  Keep the area clean and dry and do not get wet for 24 hours however, after this please wash daily with soap and water.  Watch for signs of infection including increased redness, swelling, purulent drainage, fevers and if these occur please return.  Otherwise, please wash daily with soap and water and do not submerge in any lakes, rivers, dirty bath water, dirty dishwater other until fully healed.  Keep the finger splint on at all times unless you are washing your wound or showering.

## 2025-06-19 NOTE — ED TRIAGE NOTES
At 1pm, patient smashed middle left finger in shopping cart. Patient went to  and was dx with fx. Bleeding controled in triage.     Triage Assessment (Adult)       Row Name 06/19/25 3427          Triage Assessment    Airway WDL WDL        Respiratory WDL    Respiratory WDL WDL        Skin Circulation/Temperature WDL    Skin Circulation/Temperature WDL WDL        Cardiac WDL    Cardiac WDL WDL        Peripheral/Neurovascular WDL    Peripheral Neurovascular WDL WDL        Cognitive/Neuro/Behavioral WDL    Cognitive/Neuro/Behavioral WDL WDL

## 2025-06-19 NOTE — ED PROVIDER NOTES
EMERGENCY DEPARTMENT ENCOUNTER      NAME: Tiffanie Ye  AGE: 49 year old female  YOB: 1975  MRN: [id number]  EVALUATION DATE & TIME: No admission date for patient encounter.    PCP: [facility], [facility] [place]    ED PROVIDER: Shyanne Frances PA-C      Chief Complaint   Patient presents with    Hand Pain         FINAL IMPRESSION:  1. Finger laceration    2. Open fracture of tuft of distal phalanx of finger          MEDICAL DECISION MAKING:    Pertinent Labs & Imaging studies reviewed. (See chart for details)  49 year old female presents to the Emergency Department for evaluation of injury to the left middle digit.  X-ray performed at urgent care and showed tuft fracture, nail still intact, tetanus updated at urgent care.  Do not feel further imaging is warranted here.  CMS intact.  Wound was anesthetized, cleaned and repaired as detailed below and patient tolerated procedure well.  With open tuft fracture we will have her closely follow-up with [facility] and will place on antibiotics.  Aluminum splint placed as well and discussed Tylenol and ibuprofen for pain control.  We discussed wound cares and reasons to return to the emergency department and she was agreement understanding.  Questions answered best my ability and she was discharged home in stable condition.    Medical Decision Making  Discharge. I prescribed additional prescription strength medication(s) as charted. See documentation for any additional details.    MIPS (CTPE, Dental pain, Mijares, Sinusitis, Asthma/COPD, Head Trauma): Not Applicable    SEPSIS: None         ED COURSE:  6:15 PM I met with the patient, obtained history, performed an initial exam, and discussed options and plan for diagnostics and treatment here in the ED.    6:45 PM Patient discharged after being provided with extensive anticipatory guidance and given return precautions, importance of PCP follow-up emphasized.    At the conclusion of the encounter I  discussed the results of all of the tests and the disposition. The questions were answered. The patient or family acknowledged understanding and was agreeable with the care plan.     MEDICATIONS GIVEN IN THE EMERGENCY:  Medications - No data to display    NEW PRESCRIPTIONS STARTED AT TODAY'S ER VISIT  New Prescriptions    CEPHALEXIN (KEFLEX) 500 MG CAPSULE    Take 1 capsule (500 mg) by mouth 4 times daily for 5 days.            =================================================================    HPI:    Patient information was obtained from: The patient    Use of Interpretor: N/A          Tiffanie Ye is a 49 year old female with a pertinent history of migraine, anxiety, PTSD, hashimotos, alcohol abuse who presents to this ED via private vehicle for evaluation of a finger injury.  He was at work around 1 PM when she smashed her left middle digit between shopping carts.  She went to urgent care, had x-rays that showed a distal tuft fracture and was sent here for repair.  On my evaluation, patient reports minimal pain.  Tetanus updated at urgent care.  Able to move finger without difficulty.  No other concerns voiced.      REVIEW OF SYSTEMS:  Negative unless otherwise stated in the above HPI.       PAST MEDICAL HISTORY:  No past medical history on file.    PAST SURGICAL HISTORY:  Past Surgical History:   Procedure Laterality Date    OK D&C OF CERVIX STUMP      Description: Dilation And Curettage Of Cervical Stump;  Recorded: 09/08/2013;           CURRENT MEDICATIONS:    No current facility-administered medications for this encounter.    Current Outpatient Medications:     cephALEXin (KEFLEX) 500 MG capsule, Take 1 capsule (500 mg) by mouth 4 times daily for 5 days., Disp: 20 capsule, Rfl: 0    ALPRAZolam (XANAX) 0.5 MG tablet, [ALPRAZOLAM (XANAX) 0.5 MG TABLET] TAKE 1 TABLET(0.5 MG) BY MOUTH THREE TIMES DAILY AS NEEDED FOR SLEEP OR ANXIETY, Disp: 30 tablet, Rfl: 0    levothyroxine (SYNTHROID, LEVOTHROID) 75 MCG  "tablet, [LEVOTHYROXINE (SYNTHROID, LEVOTHROID) 75 MCG TABLET] Take 1 tablet (75 mcg total) by mouth daily. New med discontinue the Oralia Jarrett 6/25/2021, Disp: 90 tablet, Rfl: 3    norethindrone-ethinyl estradiol (JUNEL FE 1/20) 1-20 MG-MCG tablet, Take 1 tablet by mouth daily (Patient not taking: Reported on 3/6/2023), Disp: 84 tablet, Rfl: 3    sertraline (ZOLOFT) 100 MG tablet, Take 1 tablet (100 mg) by mouth daily, Disp: 90 tablet, Rfl: 0      ALLERGIES:  Allergies   Allergen Reactions    Nsaids GI Disturbance     His IBS so cannot take NSAIDs    Penicillins Unknown    Tolmetin Unknown     Other reaction(s): Other, see comments, PN: has stomach issues, contraindicated       FAMILY HISTORY:  Family History   Problem Relation Age of Onset    Breast Cancer Mother 67        no genetic     Hypertension Father     Colon Cancer Paternal Grandmother 80.00    Breast Cancer Maternal Grandmother        SOCIAL HISTORY:   Social History     Socioeconomic History    Marital status: Single   Tobacco Use    Smoking status: Former    Smokeless tobacco: Never    Tobacco comments:     Quit smoking when she was 26 years old - she smoked for 10 years prior to that   Substance and Sexual Activity    Alcohol use: Yes     Comment: Alcoholic Drinks/day: once every 2 weeks    Drug use: No    Sexual activity: Not Currently       VITALS:  Patient Vitals for the past 24 hrs:   BP Temp Temp src Pulse Resp SpO2 Height Weight   06/19/25 1612 (!) 150/86 98.2  F (36.8  C) Oral 73 20 99 % 1.549 m (5' 1\") 61.9 kg (136 lb 8 oz)       PHYSICAL EXAM    Constitutional: Well developed, Well nourished, NAD  HENT: Normocephalic, Atraumatic, Bilateral external ears normal, Oropharynx normal, mucous membranes moist, Nose normal.   Neck: Normal range of motion, No tenderness, Supple, No stridor.  Eyes: Eyes track normally throughout exam, Conjunctiva normal, No discharge.   Musculoskeletal: Good range of motion in all major joints. Tenderness to the " distal left middle digit, normal range of motion, CMS intact.  Integument: Warm, Dry, No erythema, No rash. No petechiae.  Neurologic: Alert & oriented x 3, Normal motor function, Normal sensory function, No focal deficits noted. Normal gait.  Psychiatric: Affect normal, Judgment normal, Mood normal. Cooperative.    LAB:  All pertinent labs reviewed and interpreted.  No results found for this or any previous visit (from the past 24 hours).      RADIOLOGY:  Reviewed all pertinent imaging. Please see official radiology report.  No orders to display       PROCEDURES:   PROCEDURE: Laceration Repair   INDICATIONS: Laceration   PROCEDURE PROVIDER: Shyanne Frances PA-C   SITE: Left middle digit   TYPE/SIZE: simple, subcutaneous, and no foreign body visualized  4 cm (total length)   FUNCTIONAL ASSESSMENT: Distal sensation, circulation, motor, and tendon function intact   MEDICATION: Digital block   PREPARATION: soaking, scrubbing, and irrigation with Normal saline and wound cleanser   DEBRIDEMENT: no debridement   CLOSURE:  Superficial layer closed with 4 stitches of 4-0 Ethilon simple interrupted    Total number of sutures/staples placed: 4     PROCEDURE: Digital Block   INDICATIONS: Finger laceration   PROCEDURE PROVIDER: Shyanne Frances PA-C   SITE: Left middle finger (3rd digit)   MEDICATION: 2 mL of 1% Lidocaine without epinephrine   NOTE: The skin overlying the site for injection was prepped with chlorhexidine.  Needle was inserted in a standard three point injection pattern.  Each time the area was aspirated and there was no return of blood.  I then injected the medication at the base of the digit.  The patient had good response to the procedure   COMPLICATIONS: Patient tolerated procedure well, without complication      Shyanne Frances PA-C  Emergency Medicine  [facility]  6/19/2025      Shyanne Frances PA-C  06/19/25 [id number]